# Patient Record
Sex: MALE | Race: WHITE | NOT HISPANIC OR LATINO | Employment: FULL TIME | URBAN - NONMETROPOLITAN AREA
[De-identification: names, ages, dates, MRNs, and addresses within clinical notes are randomized per-mention and may not be internally consistent; named-entity substitution may affect disease eponyms.]

---

## 2016-07-01 LAB — CRC RECOMMENDATION EXT: NORMAL

## 2019-08-19 ENCOUNTER — HISTORICAL (OUTPATIENT)
Dept: ADMINISTRATIVE | Facility: HOSPITAL | Age: 49
End: 2019-08-19

## 2020-02-27 LAB
INFLUENZA A ANTIGEN, POC: NEGATIVE
INFLUENZA B ANTIGEN, POC: NEGATIVE

## 2021-03-06 ENCOUNTER — HISTORICAL (OUTPATIENT)
Dept: ADMINISTRATIVE | Facility: HOSPITAL | Age: 51
End: 2021-03-06

## 2021-06-11 ENCOUNTER — HISTORICAL (OUTPATIENT)
Dept: ADMINISTRATIVE | Facility: HOSPITAL | Age: 51
End: 2021-06-11

## 2021-06-11 LAB
ALBUMIN SERPL-MCNC: 4.9 G/DL (ref 3.8–4.9)
ALBUMIN/GLOB SERPL: 2.3 {RATIO} (ref 1.2–2.2)
ALP SERPL-CCNC: 70 IU/L (ref 48–121)
ALT SERPL-CCNC: 42 IU/L (ref 0–44)
AST SERPL-CCNC: 22 IU/L (ref 0–40)
BASOPHILS # BLD AUTO: 0.1 X10E3/UL (ref 0–0.2)
BASOPHILS NFR BLD AUTO: 1 %
BILIRUB SERPL-MCNC: 0.7 MG/DL (ref 0–1.2)
BUN SERPL-MCNC: 18 MG/DL (ref 6–24)
CALCIUM SERPL-MCNC: 10.8 MG/DL (ref 8.7–10.2)
CHLORIDE SERPL-SCNC: 101 MMOL/L (ref 96–106)
CHOLEST SERPL-MCNC: 169 MG/DL (ref 100–199)
CHOLEST/HDLC SERPL: 4.4 RATIO (ref 0–5)
CO2 SERPL-SCNC: 23 MMOL/L (ref 20–29)
CREAT SERPL-MCNC: 1.01 MG/DL (ref 0.76–1.27)
CREAT/UREA NIT SERPL: 18 (ref 9–20)
EOSINOPHIL # BLD AUTO: 0.4 X10E3/UL (ref 0–0.4)
EOSINOPHIL NFR BLD AUTO: 5 %
ERYTHROCYTE [DISTWIDTH] IN BLOOD BY AUTOMATED COUNT: 12.7 % (ref 11.6–15.4)
GLOBULIN SER-MCNC: 2.1 G/DL (ref 1.5–4.5)
GLUCOSE SERPL-MCNC: 176 MG/DL (ref 65–99)
HBA1C MFR BLD: 8.1 % (ref 4.8–5.6)
HCT VFR BLD AUTO: 52.4 % (ref 37.5–51)
HDLC SERPL-MCNC: 38 MG/DL
HGB BLD-MCNC: 17.2 G/DL (ref 13–17.7)
LDLC SERPL CALC-MCNC: 104 MG/DL (ref 0–99)
LYMPHOCYTES # BLD AUTO: 2.1 X10E3/UL (ref 0.7–3.1)
LYMPHOCYTES NFR BLD AUTO: 26 %
MCH RBC QN AUTO: 30.1 PG (ref 26.6–33)
MCHC RBC AUTO-ENTMCNC: 32.8 G/DL (ref 31.5–35.7)
MCV RBC AUTO: 92 FL (ref 79–97)
MONOCYTES # BLD AUTO: 0.7 X10E3/UL (ref 0.1–0.9)
MONOCYTES NFR BLD AUTO: 8 %
NEUTROPHILS # BLD AUTO: 5 X10E3/UL (ref 1.4–7)
NEUTROPHILS NFR BLD AUTO: 60 %
PLATELET # BLD AUTO: 229 X10E3/UL (ref 150–450)
POTASSIUM SERPL-SCNC: 4.9 MMOL/L (ref 3.5–5.2)
PROT SERPL-MCNC: 7 G/DL (ref 6–8.5)
RBC # BLD AUTO: 5.72 X10(6)/MCL (ref 4.14–5.8)
SODIUM SERPL-SCNC: 141 MMOL/L (ref 134–144)
TRIGL SERPL-MCNC: 153 MG/DL (ref 0–149)
TSH SERPL-ACNC: 1.19 MIU/ML (ref 0.45–4.5)
VLDLC SERPL CALC-MCNC: 27 MG/DL (ref 5–40)
WBC # SPEC AUTO: 8.2 X10E3/UL (ref 3.4–10.8)

## 2021-08-20 LAB
LEFT EYE DM RETINOPATHY: POSITIVE
RIGHT EYE DM RETINOPATHY: POSITIVE

## 2021-08-27 LAB
LEFT EYE DM RETINOPATHY: POSITIVE
RIGHT EYE DM RETINOPATHY: POSITIVE

## 2021-09-10 ENCOUNTER — HISTORICAL (OUTPATIENT)
Dept: ADMINISTRATIVE | Facility: HOSPITAL | Age: 51
End: 2021-09-10

## 2021-09-10 LAB
ALBUMIN SERPL-MCNC: 4.9 G/DL (ref 3.8–4.9)
ALBUMIN/GLOB SERPL: 2.1 {RATIO} (ref 1.2–2.2)
ALP SERPL-CCNC: 78 IU/L (ref 48–121)
ALT SERPL-CCNC: 33 IU/L (ref 0–44)
AST SERPL-CCNC: 18 IU/L (ref 0–40)
BASOPHILS # BLD AUTO: 0.1 X10E3/UL (ref 0–0.2)
BASOPHILS NFR BLD AUTO: 1 %
BILIRUB SERPL-MCNC: 0.6 MG/DL (ref 0–1.2)
BUN SERPL-MCNC: 24 MG/DL (ref 6–24)
CALCIUM SERPL-MCNC: 10.1 MG/DL (ref 8.7–10.2)
CHLORIDE SERPL-SCNC: 103 MMOL/L (ref 96–106)
CHOLEST SERPL-MCNC: 173 MG/DL (ref 100–199)
CHOLEST/HDLC SERPL: 4.8 RATIO (ref 0–5)
CO2 SERPL-SCNC: 20 MMOL/L (ref 20–29)
CREAT SERPL-MCNC: 0.97 MG/DL (ref 0.76–1.27)
CREAT/UREA NIT SERPL: 25 (ref 9–20)
EOSINOPHIL # BLD AUTO: 0.3 X10E3/UL (ref 0–0.4)
EOSINOPHIL NFR BLD AUTO: 5 %
ERYTHROCYTE [DISTWIDTH] IN BLOOD BY AUTOMATED COUNT: 12.7 % (ref 11.6–15.4)
GLOBULIN SER-MCNC: 2.3 G/DL (ref 1.5–4.5)
GLUCOSE SERPL-MCNC: 181 MG/DL (ref 65–99)
HBA1C MFR BLD: 7.4 % (ref 4.8–5.6)
HCT VFR BLD AUTO: 49.3 % (ref 37.5–51)
HDLC SERPL-MCNC: 36 MG/DL
HGB BLD-MCNC: 16.2 G/DL (ref 13–17.7)
LDLC SERPL CALC-MCNC: 106 MG/DL (ref 0–99)
LYMPHOCYTES # BLD AUTO: 2.3 X10E3/UL (ref 0.7–3.1)
LYMPHOCYTES NFR BLD AUTO: 32 %
MCH RBC QN AUTO: 30.1 PG (ref 26.6–33)
MCHC RBC AUTO-ENTMCNC: 32.9 G/DL (ref 31.5–35.7)
MCV RBC AUTO: 92 FL (ref 79–97)
MONOCYTES # BLD AUTO: 0.6 X10E3/UL (ref 0.1–0.9)
MONOCYTES NFR BLD AUTO: 8 %
NEUTROPHILS # BLD AUTO: 3.9 X10E3/UL (ref 1.4–7)
NEUTROPHILS NFR BLD AUTO: 54 %
PLATELET # BLD AUTO: 232 X10E3/UL (ref 150–450)
POTASSIUM SERPL-SCNC: 4.9 MMOL/L (ref 3.5–5.2)
PROT SERPL-MCNC: 7.2 G/DL (ref 6–8.5)
RBC # BLD AUTO: 5.39 X10(6)/MCL (ref 4.14–5.8)
SODIUM SERPL-SCNC: 136 MMOL/L (ref 134–144)
TRIGL SERPL-MCNC: 178 MG/DL (ref 0–149)
VLDLC SERPL CALC-MCNC: 31 MG/DL (ref 5–40)
WBC # SPEC AUTO: 7.2 X10E3/UL (ref 3.4–10.8)

## 2022-04-10 ENCOUNTER — HISTORICAL (OUTPATIENT)
Dept: ADMINISTRATIVE | Facility: HOSPITAL | Age: 52
End: 2022-04-10

## 2022-04-28 VITALS
DIASTOLIC BLOOD PRESSURE: 78 MMHG | HEIGHT: 71 IN | BODY MASS INDEX: 30.86 KG/M2 | WEIGHT: 220.44 LBS | SYSTOLIC BLOOD PRESSURE: 134 MMHG | OXYGEN SATURATION: 97 %

## 2022-09-17 ENCOUNTER — HISTORICAL (OUTPATIENT)
Dept: ADMINISTRATIVE | Facility: HOSPITAL | Age: 52
End: 2022-09-17

## 2023-01-17 ENCOUNTER — DOCUMENTATION ONLY (OUTPATIENT)
Dept: ADMINISTRATIVE | Facility: HOSPITAL | Age: 53
End: 2023-01-17

## 2023-02-20 ENCOUNTER — TELEPHONE (OUTPATIENT)
Dept: FAMILY MEDICINE | Facility: CLINIC | Age: 53
End: 2023-02-20
Payer: COMMERCIAL

## 2023-05-04 PROCEDURE — 80061 LIPID PANEL: CPT | Performed by: FAMILY MEDICINE

## 2023-05-04 PROCEDURE — 84443 ASSAY THYROID STIM HORMONE: CPT | Performed by: FAMILY MEDICINE

## 2023-05-04 PROCEDURE — 83036 HEMOGLOBIN GLYCOSYLATED A1C: CPT | Performed by: FAMILY MEDICINE

## 2023-05-04 PROCEDURE — 84153 ASSAY OF PSA TOTAL: CPT | Performed by: FAMILY MEDICINE

## 2023-05-04 PROCEDURE — 85025 COMPLETE CBC W/AUTO DIFF WBC: CPT | Performed by: FAMILY MEDICINE

## 2023-05-04 PROCEDURE — 80053 COMPREHEN METABOLIC PANEL: CPT | Performed by: FAMILY MEDICINE

## 2023-05-10 ENCOUNTER — OFFICE VISIT (OUTPATIENT)
Dept: FAMILY MEDICINE | Facility: CLINIC | Age: 53
End: 2023-05-10
Payer: COMMERCIAL

## 2023-05-10 VITALS
TEMPERATURE: 97 F | OXYGEN SATURATION: 97 % | WEIGHT: 226.38 LBS | BODY MASS INDEX: 31.69 KG/M2 | HEART RATE: 80 BPM | HEIGHT: 71 IN | DIASTOLIC BLOOD PRESSURE: 80 MMHG | SYSTOLIC BLOOD PRESSURE: 138 MMHG

## 2023-05-10 DIAGNOSIS — E11.9 TYPE 2 DIABETES MELLITUS WITHOUT COMPLICATION, WITHOUT LONG-TERM CURRENT USE OF INSULIN: ICD-10-CM

## 2023-05-10 DIAGNOSIS — E78.5 HYPERLIPIDEMIA, UNSPECIFIED HYPERLIPIDEMIA TYPE: ICD-10-CM

## 2023-05-10 DIAGNOSIS — M51.34 DEGENERATION OF INTERVERTEBRAL DISC OF THORACIC REGION: Primary | ICD-10-CM

## 2023-05-10 DIAGNOSIS — Z00.00 WELLNESS EXAMINATION: ICD-10-CM

## 2023-05-10 DIAGNOSIS — I10 PRIMARY HYPERTENSION: ICD-10-CM

## 2023-05-10 DIAGNOSIS — Z86.010 HISTORY OF COLONIC POLYPS: ICD-10-CM

## 2023-05-10 DIAGNOSIS — N40.0 BENIGN PROSTATIC HYPERPLASIA WITHOUT URINARY OBSTRUCTION: ICD-10-CM

## 2023-05-10 DIAGNOSIS — M79.675 PAIN OF TOE OF LEFT FOOT: ICD-10-CM

## 2023-05-10 PROBLEM — Z86.0100 HISTORY OF COLONIC POLYPS: Status: ACTIVE | Noted: 2023-05-10

## 2023-05-10 PROCEDURE — 3008F BODY MASS INDEX DOCD: CPT | Mod: CPTII,,, | Performed by: FAMILY MEDICINE

## 2023-05-10 PROCEDURE — 99396 PREV VISIT EST AGE 40-64: CPT | Mod: ,,, | Performed by: FAMILY MEDICINE

## 2023-05-10 PROCEDURE — 3008F PR BODY MASS INDEX (BMI) DOCUMENTED: ICD-10-PCS | Mod: CPTII,,, | Performed by: FAMILY MEDICINE

## 2023-05-10 PROCEDURE — 3075F PR MOST RECENT SYSTOLIC BLOOD PRESS GE 130-139MM HG: ICD-10-PCS | Mod: CPTII,,, | Performed by: FAMILY MEDICINE

## 2023-05-10 PROCEDURE — 99214 PR OFFICE/OUTPT VISIT, EST, LEVL IV, 30-39 MIN: ICD-10-PCS | Mod: 25,,, | Performed by: FAMILY MEDICINE

## 2023-05-10 PROCEDURE — 3075F SYST BP GE 130 - 139MM HG: CPT | Mod: CPTII,,, | Performed by: FAMILY MEDICINE

## 2023-05-10 PROCEDURE — 3079F PR MOST RECENT DIASTOLIC BLOOD PRESSURE 80-89 MM HG: ICD-10-PCS | Mod: CPTII,,, | Performed by: FAMILY MEDICINE

## 2023-05-10 PROCEDURE — 3079F DIAST BP 80-89 MM HG: CPT | Mod: CPTII,,, | Performed by: FAMILY MEDICINE

## 2023-05-10 PROCEDURE — 1159F PR MEDICATION LIST DOCUMENTED IN MEDICAL RECORD: ICD-10-PCS | Mod: CPTII,,, | Performed by: FAMILY MEDICINE

## 2023-05-10 PROCEDURE — 99396 PR PREVENTIVE VISIT,EST,40-64: ICD-10-PCS | Mod: ,,, | Performed by: FAMILY MEDICINE

## 2023-05-10 PROCEDURE — 4010F PR ACE/ARB THEARPY RXD/TAKEN: ICD-10-PCS | Mod: CPTII,,, | Performed by: FAMILY MEDICINE

## 2023-05-10 PROCEDURE — 4010F ACE/ARB THERAPY RXD/TAKEN: CPT | Mod: CPTII,,, | Performed by: FAMILY MEDICINE

## 2023-05-10 PROCEDURE — 1159F MED LIST DOCD IN RCRD: CPT | Mod: CPTII,,, | Performed by: FAMILY MEDICINE

## 2023-05-10 PROCEDURE — 99214 OFFICE O/P EST MOD 30 MIN: CPT | Mod: 25,,, | Performed by: FAMILY MEDICINE

## 2023-05-10 RX ORDER — CYCLOBENZAPRINE HCL 5 MG
5 TABLET ORAL NIGHTLY
Qty: 30 TABLET | Refills: 2 | Status: SHIPPED | OUTPATIENT
Start: 2023-05-10 | End: 2023-08-08

## 2023-05-10 RX ORDER — EMPAGLIFLOZIN 25 MG/1
25 TABLET, FILM COATED ORAL DAILY
COMMUNITY
Start: 2023-03-06 | End: 2023-12-07 | Stop reason: SDUPTHER

## 2023-05-10 RX ORDER — PEN NEEDLE, DIABETIC 31 GX3/16"
NEEDLE, DISPOSABLE MISCELLANEOUS
COMMUNITY
Start: 2023-03-06 | End: 2024-01-02

## 2023-05-10 RX ORDER — ATORVASTATIN CALCIUM 10 MG/1
10 TABLET, FILM COATED ORAL DAILY
COMMUNITY
Start: 2022-12-12 | End: 2023-05-10

## 2023-05-10 RX ORDER — METFORMIN HYDROCHLORIDE 1000 MG/1
1000 TABLET ORAL 2 TIMES DAILY
COMMUNITY
Start: 2022-12-12 | End: 2023-10-03

## 2023-05-10 RX ORDER — FLUOXETINE HYDROCHLORIDE 20 MG/1
20 CAPSULE ORAL DAILY
COMMUNITY
Start: 2023-03-01 | End: 2023-12-07 | Stop reason: SDUPTHER

## 2023-05-10 RX ORDER — ROSUVASTATIN CALCIUM 10 MG/1
10 TABLET, COATED ORAL DAILY
Qty: 90 TABLET | Refills: 3 | Status: SHIPPED | OUTPATIENT
Start: 2023-05-10 | End: 2024-02-09

## 2023-05-10 RX ORDER — SILDENAFIL 100 MG/1
100 TABLET, FILM COATED ORAL
COMMUNITY
Start: 2022-12-12 | End: 2023-07-25

## 2023-05-10 RX ORDER — (INSULIN DEGLUDEC AND LIRAGLUTIDE) 100; 3.6 [IU]/ML; MG/ML
40 INJECTION, SOLUTION SUBCUTANEOUS DAILY
COMMUNITY
Start: 2022-04-28 | End: 2023-12-07 | Stop reason: SDUPTHER

## 2023-05-10 RX ORDER — TAMSULOSIN HYDROCHLORIDE 0.4 MG/1
1 CAPSULE ORAL DAILY
COMMUNITY
Start: 2023-04-07 | End: 2023-12-07 | Stop reason: SDUPTHER

## 2023-05-10 NOTE — PROGRESS NOTES
SUBJECTIVE:  Juanita Rosa is a 52 y.o. male here for Follow-up (Lab results)      Follow-up  Associated symptoms include arthralgias. Pertinent negatives include no abdominal pain, chest pain, congestion, coughing, fatigue, fever, headaches, joint swelling, myalgias, rash, sore throat or weakness.   Patient here for annual wellness and follow-up on chronic medical conditions.  See assessment and plan for individual list of issues.  Bruces allergies, medications, history, and problem list were updated as appropriate.    Review of Systems   Constitutional:  Negative for activity change, appetite change, fatigue and fever.   HENT:  Negative for congestion, ear pain, hearing loss, sore throat and trouble swallowing.    Eyes:  Negative for photophobia, pain, redness and visual disturbance.   Respiratory:  Negative for cough, chest tightness, shortness of breath and wheezing.    Cardiovascular:  Negative for chest pain, palpitations and leg swelling.   Gastrointestinal:  Negative for abdominal distention, abdominal pain and blood in stool.   Endocrine: Negative for cold intolerance, heat intolerance, polydipsia and polyuria.   Genitourinary:  Negative for difficulty urinating, dysuria and frequency.   Musculoskeletal:  Positive for arthralgias and back pain. Negative for gait problem, joint swelling and myalgias.   Skin:  Negative for color change, pallor and rash.   Allergic/Immunologic: Negative.    Neurological:  Negative for dizziness, seizures, speech difficulty, weakness and headaches.   Hematological:  Negative for adenopathy. Does not bruise/bleed easily.   Psychiatric/Behavioral:  Negative for agitation and confusion.     A comprehensive review of symptoms was completed and negative except as noted above.    Recent Results (from the past 504 hour(s))   Comprehensive Metabolic Panel    Collection Time: 05/04/23  9:37 AM   Result Value Ref Range    Sodium Level 140 135 - 145 mmol/L    Potassium Level 4.8 3.5 - 5.1  mmol/L    Chloride 106 98 - 110 mmol/L    Carbon Dioxide 24 21 - 32 mmol/L    Glucose Level 191 (H) 70 - 115 mg/dL    Blood Urea Nitrogen 23.0 (H) 7.0 - 20.0 mg/dL    Creatinine 1.00 0.66 - 1.25 mg/dL    Calcium Level Total 10.3 (H) 8.4 - 10.2 mg/dL    Protein Total 7.4 6.3 - 8.2 gm/dL    Albumin Level 4.7 3.4 - 5.0 g/dL    Globulin 2.7 2.0 - 3.9 gm/dL    Albumin/Globulin Ratio 1.7 ratio    Bilirubin Total 0.8 0.0 - 1.0 mg/dL    Alkaline Phosphatase 70 50 - 144 unit/L    Alanine Aminotransferase 43 1 - 45 unit/L    Aspartate Aminotransferase 32 17 - 59 unit/L    eGFR 90 mls/min/1.73/m2    Anion Gap 10.0 2.0 - 13.0 mEq/L    BUN/Creatinine Ratio 23 (H) 12 - 20   Hemoglobin A1C    Collection Time: 05/04/23  9:37 AM   Result Value Ref Range    Hemoglobin A1c 7.5 (H) 4.0 - 6.0 %    Estimated Average Glucose 168.6 (H) 70.0 - 115.0 mg/dL   Lipid Panel    Collection Time: 05/04/23  9:37 AM   Result Value Ref Range    Cholesterol Total 206 (H) 0 - 200 mg/dL    HDL Cholesterol 32 (L) 40 - 60 mg/dL    Triglyceride 206 (H) 30 - 200 mg/dL    LDL Cholesterol Direct 125.4 (H) 30.0 - 100.0 mg/dL   TSH    Collection Time: 05/04/23  9:37 AM   Result Value Ref Range    Thyroid Stimulating Hormone 0.819 0.360 - 3.740 uIU/mL   PSA, Total (Diagnostic)    Collection Time: 05/04/23  9:37 AM   Result Value Ref Range    Prostate Specific Antigen 0.75 <=4.00 ng/mL   CBC with Differential    Collection Time: 05/04/23  9:37 AM   Result Value Ref Range    WBC 8.19 4.00 - 11.50 x10(3)/mcL    RBC 5.86 4.00 - 6.00 x10(6)/mcL    Hgb 17.3 13.0 - 18.0 g/dL    Hct 50.5 36.0 - 52.0 %    MCV 86.2 79.0 - 99.0 fL    MCH 29.5 27.0 - 34.0 pg    MCHC 34.3 31.0 - 37.0 g/dL    RDW 12.2 %    Platelet 282 140 - 371 x10(3)/mcL    MPV 10.4 9.4 - 12.4 fL    Neut % 58.0 37 - 73 %    Lymph % 26.3 20 - 55 %    Mono % 8.1 4.7 - 12.5 %    Eos % 6.1 0.7 - 7 %    Basophil % 1.0 0.1 - 1.2 %    Lymph # 2.15 1.32 - 3.57 x10(3)/mcL    Neut # 4.76 1.78 - 5.38 x10(3)/mcL    Mono  "# 0.66 0.3 - 0.82 x10(3)/mcL    Eos # 0.50 0.04 - 0.54 x10(3)/mcL    Baso # 0.08 0.01 - 0.08 x10(3)/mcL    IG# 0.04 (H) 0.0001 - 0.031 x10(3)/mcL    IG% 0.5 0 - 0.5 %    NRBC% 0.0 0 - 1 %       OBJECTIVE:  Vital signs  Vitals:    05/10/23 0953   BP: 138/80   BP Location: Right arm   Patient Position: Sitting   BP Method: Medium (Manual)   Pulse: 80   Temp: 97.4 °F (36.3 °C)   TempSrc: Oral   SpO2: 97%   Weight: 102.7 kg (226 lb 6.4 oz)   Height: 5' 10.67" (1.795 m)        Physical Exam  Vitals and nursing note reviewed.   Constitutional:       General: He is not in acute distress.     Appearance: Normal appearance. He is not ill-appearing.   HENT:      Head: Normocephalic and atraumatic.      Right Ear: External ear normal.      Left Ear: External ear normal.      Nose: Nose normal.      Mouth/Throat:      Mouth: Mucous membranes are moist.      Pharynx: Oropharynx is clear.   Eyes:      Extraocular Movements: Extraocular movements intact.      Conjunctiva/sclera: Conjunctivae normal.   Cardiovascular:      Rate and Rhythm: Normal rate and regular rhythm.      Heart sounds: Normal heart sounds. No murmur heard.  Pulmonary:      Effort: Pulmonary effort is normal.      Breath sounds: Normal breath sounds. No wheezing or rhonchi.   Abdominal:      General: Abdomen is flat. There is no distension.      Palpations: Abdomen is soft.      Tenderness: There is no abdominal tenderness.   Musculoskeletal:         General: No swelling or deformity. Normal range of motion.      Cervical back: Normal range of motion and neck supple.   Skin:     General: Skin is warm and dry.      Findings: No bruising or rash.      Comments: Toenail dystrophy of the left great toenail   Neurological:      General: No focal deficit present.      Mental Status: He is alert and oriented to person, place, and time.      Cranial Nerves: No cranial nerve deficit.      Motor: No weakness.   Psychiatric:         Mood and Affect: Mood normal.         " Behavior: Behavior normal.         Thought Content: Thought content normal.        ASSESSMENT/PLAN:  1. 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 Hyperlipidemia, unspecified hyperlipidemia type  LDL is not at goal.  We will switch from atorvastatin 10 mg daily to rosuvastatin 10 mg daily    3. Primary hypertension  Currently on lisinopril    4. Benign prostatic hyperplasia without urinary obstruction  Flomax    5. Type 2 diabetes mellitus without complication, without long-term current use of insulin  On Xultophy 40 units daily, Jardiance, and metformin.  If he can increase his exercise I would like him to stay at 40 units but if he will not increase his walking and exercise go ahead and increase to 50 units daily as his fasting sugars are around 200.  Current A1c is 7.5  Also try to get him set up with continuous glucose monitoring  6. History of colonic polyps  It is time for a colonoscopy we will get this set up    7. Wellness examination  PSA is normal.  Set up colonoscopy    8. Toe pain with nail dystrophy - podiatry referral  Other orders  -     cyclobenzaprine (FLEXERIL) 5 MG tablet; Take 1 tablet (5 mg total) by mouth nightly.  Dispense: 30 tablet; Refill: 2  -     rosuvastatin (CRESTOR) 10 MG tablet; Take 1 tablet (10 mg total) by mouth once daily.  Dispense: 90 tablet; Refill: 3         Follow Up:  Follow up in about 6 months (around 11/10/2023) for recheck, fasting labs.

## 2023-06-19 RX ORDER — PHENTERMINE HYDROCHLORIDE 37.5 MG/1
TABLET ORAL
Qty: 30 TABLET | Refills: 3 | Status: SHIPPED | OUTPATIENT
Start: 2023-06-19 | End: 2023-10-23

## 2023-06-23 ENCOUNTER — OFFICE VISIT (OUTPATIENT)
Dept: FAMILY MEDICINE | Facility: CLINIC | Age: 53
End: 2023-06-23
Payer: COMMERCIAL

## 2023-06-23 VITALS
SYSTOLIC BLOOD PRESSURE: 122 MMHG | BODY MASS INDEX: 31.36 KG/M2 | HEIGHT: 71 IN | TEMPERATURE: 98 F | WEIGHT: 224 LBS | DIASTOLIC BLOOD PRESSURE: 80 MMHG | HEART RATE: 83 BPM | OXYGEN SATURATION: 99 %

## 2023-06-23 DIAGNOSIS — L08.89 SECONDARY INFECTION OF SKIN: Primary | ICD-10-CM

## 2023-06-23 PROCEDURE — 3008F PR BODY MASS INDEX (BMI) DOCUMENTED: ICD-10-PCS | Mod: CPTII,,, | Performed by: NURSE PRACTITIONER

## 2023-06-23 PROCEDURE — 3079F DIAST BP 80-89 MM HG: CPT | Mod: CPTII,,, | Performed by: NURSE PRACTITIONER

## 2023-06-23 PROCEDURE — 3074F SYST BP LT 130 MM HG: CPT | Mod: CPTII,,, | Performed by: NURSE PRACTITIONER

## 2023-06-23 PROCEDURE — 4010F PR ACE/ARB THEARPY RXD/TAKEN: ICD-10-PCS | Mod: CPTII,,, | Performed by: NURSE PRACTITIONER

## 2023-06-23 PROCEDURE — 3074F PR MOST RECENT SYSTOLIC BLOOD PRESSURE < 130 MM HG: ICD-10-PCS | Mod: CPTII,,, | Performed by: NURSE PRACTITIONER

## 2023-06-23 PROCEDURE — 3079F PR MOST RECENT DIASTOLIC BLOOD PRESSURE 80-89 MM HG: ICD-10-PCS | Mod: CPTII,,, | Performed by: NURSE PRACTITIONER

## 2023-06-23 PROCEDURE — 1160F RVW MEDS BY RX/DR IN RCRD: CPT | Mod: CPTII,,, | Performed by: NURSE PRACTITIONER

## 2023-06-23 PROCEDURE — 99213 PR OFFICE/OUTPT VISIT, EST, LEVL III, 20-29 MIN: ICD-10-PCS | Mod: ,,, | Performed by: NURSE PRACTITIONER

## 2023-06-23 PROCEDURE — 99213 OFFICE O/P EST LOW 20 MIN: CPT | Mod: ,,, | Performed by: NURSE PRACTITIONER

## 2023-06-23 PROCEDURE — 1159F MED LIST DOCD IN RCRD: CPT | Mod: CPTII,,, | Performed by: NURSE PRACTITIONER

## 2023-06-23 PROCEDURE — 4010F ACE/ARB THERAPY RXD/TAKEN: CPT | Mod: CPTII,,, | Performed by: NURSE PRACTITIONER

## 2023-06-23 PROCEDURE — 3008F BODY MASS INDEX DOCD: CPT | Mod: CPTII,,, | Performed by: NURSE PRACTITIONER

## 2023-06-23 PROCEDURE — 1159F PR MEDICATION LIST DOCUMENTED IN MEDICAL RECORD: ICD-10-PCS | Mod: CPTII,,, | Performed by: NURSE PRACTITIONER

## 2023-06-23 PROCEDURE — 1160F PR REVIEW ALL MEDS BY PRESCRIBER/CLIN PHARMACIST DOCUMENTED: ICD-10-PCS | Mod: CPTII,,, | Performed by: NURSE PRACTITIONER

## 2023-06-23 RX ORDER — CLINDAMYCIN HYDROCHLORIDE 300 MG/1
300 CAPSULE ORAL EVERY 6 HOURS
Qty: 40 CAPSULE | Refills: 0 | Status: SHIPPED | OUTPATIENT
Start: 2023-06-23 | End: 2023-07-03

## 2023-06-23 NOTE — PROGRESS NOTES
"Patient ID: Juanita Rosa  : 1970    Chief Complaint: Rash (Since May 28th)    Allergies: Patient is allergic to sulfa (sulfonamide antibiotics).     History of Present Illness:  The patient is a 52 y.o. White male who presents to clinic for follow up on Rash (Since May 28th)     Here today with complains of rash that began nearly a month ago. Dx with shingles on the 28th May and given Acyclovir which he completed. He has painful burning lesions on his left buttocks, were blistered initially but now dry and scabbed. Does have some surrounding erythema. Does not feel that they are worsening but lesions do not seem to be healing as he feels they should. The pain has improved since the beginning. He has been washing with dial soap and applying Bactoban ointment 1-2 times daily. Denies fever.     Social History:  reports that he quit smoking about 24 years ago. His smoking use included cigarettes. He has never used smokeless tobacco. He reports current alcohol use. He reports that he does not use drugs.    Past Medical History:  has no past medical history on file.    Current Medications:  Current Outpatient Medications   Medication Instructions    clindamycin (CLEOCIN) 300 mg, Oral, Every 6 hours    cyclobenzaprine (FLEXERIL) 5 mg, Oral, Nightly    FLUoxetine 20 mg, Oral, Daily    insulin degludec-liraglutide (XULTOPHY 100/3.6) 100 unit-3.6 mg /mL (3 mL) Pen 40 Units, Subcutaneous, Daily    JARDIANCE 25 mg, Oral, Daily    lisinopriL 10 MG tablet TAKE 1 TABLET DAILY    metFORMIN (GLUCOPHAGE) 1,000 mg, Oral, 2 times daily    phentermine (ADIPEX-P) 37.5 mg tablet TAKE 1 TABLET BY MOUTH DAILY    rosuvastatin (CRESTOR) 10 mg, Oral, Daily    sildenafiL (VIAGRA) 100 mg, Oral, As needed (PRN)    SURE COMFORT PEN NEEDLE 31 gauge x 3/16" Ndle USE ONCE DAILY WITH INSULIN    tamsulosin (FLOMAX) 0.4 mg Cap 1 capsule, Oral, Daily       Review of Systems   See HPI    Visit Vitals  /80   Pulse 83   Temp 98.1 °F (36.7 °C) " "(Temporal)   Ht 5' 10.67" (1.795 m)   Wt 101.6 kg (224 lb)   SpO2 99%   BMI 31.53 kg/m²       Physical Exam  Vitals reviewed.   Cardiovascular:      Heart sounds: Normal heart sounds.   Pulmonary:      Breath sounds: Normal breath sounds.   Musculoskeletal:      Right lower leg: No edema.      Left lower leg: No edema.   Skin:     Findings: Lesion (multiple scabbed lesions left buttocks with mild surrounding erythema; no drainage noted.) present.                  Assessment & Plan:  1. Secondary infection of skin  Continue to clean lesions with antibacterial soap, pat dry and apply antibiotic ointment when feasible.  Cotton undergarments may be preferred over polyester fabrics that trap moisture.  Start clindamycin 300 mg as directed.  Call the office next week if concerns for worsening are noted.    -     clindamycin (CLEOCIN) 300 MG capsule; Take 1 capsule (300 mg total) by mouth every 6 (six) hours. for 10 days  Dispense: 40 capsule; Refill: 0         Future Appointments   Date Time Provider Department Center   11/16/2023  8:00 AM LAB, [facility] LABORATORY DRAW STATION [facility] MANUELA Cast [facility]   12/7/2023  8[facility]30 [facility]       Follow up if symptoms worsen or fail to improve. Call sooner if needed.    CEZAR Batista-ONOFRE    Lab Frequency Next Occurrence   CBC Auto Differential Once 11/10/2023   Comprehensive Metabolic Panel Once 11/10/2023   Lipid Panel Once 11/10/2023   Hemoglobin A1C Once 11/10/2023   Vitamin B12 Once 11/10/2023   Methylmalonic Acid, Serum Once 11/10/2023   Ambulatory referral/consult to Gastroenterology Once 05/17/2023   Ambulatory referral/consult to Podiatry Once 05/17/2023          "

## 2023-06-28 DIAGNOSIS — I10 HYPERTENSION, UNSPECIFIED TYPE: ICD-10-CM

## 2023-06-28 RX ORDER — LISINOPRIL 10 MG/1
10 TABLET ORAL DAILY
Qty: 90 TABLET | Refills: 1 | Status: SHIPPED | OUTPATIENT
Start: 2023-06-28 | End: 2024-02-09

## 2023-07-24 DIAGNOSIS — N52.9 ERECTILE DYSFUNCTION, UNSPECIFIED ERECTILE DYSFUNCTION TYPE: Primary | ICD-10-CM

## 2023-07-25 RX ORDER — SILDENAFIL 100 MG/1
TABLET, FILM COATED ORAL
Qty: 15 TABLET | Refills: 5 | Status: SHIPPED | OUTPATIENT
Start: 2023-07-25 | End: 2024-03-11 | Stop reason: CLARIF

## 2023-10-03 DIAGNOSIS — E11.9 TYPE 2 DIABETES MELLITUS WITHOUT COMPLICATION, WITHOUT LONG-TERM CURRENT USE OF INSULIN: Primary | ICD-10-CM

## 2023-10-03 RX ORDER — METFORMIN HYDROCHLORIDE 1000 MG/1
1000 TABLET ORAL 2 TIMES DAILY
Qty: 180 TABLET | Refills: 3 | Status: SHIPPED | OUTPATIENT
Start: 2023-10-03

## 2023-10-23 RX ORDER — PHENTERMINE HYDROCHLORIDE 37.5 MG/1
37.5 TABLET ORAL
Qty: 30 TABLET | Refills: 3 | Status: SHIPPED | OUTPATIENT
Start: 2023-10-23 | End: 2023-12-18 | Stop reason: SDUPTHER

## 2023-11-17 LAB
CREATININE, URINE: 92.4 MG/DL
MICROALB/CREAT RATIO: <13
MICROALBUMIN URINE: <12

## 2023-11-23 LAB
ALBUMIN SERPL-MCNC: 4.9 G/DL (ref 3.8–4.9)
ALBUMIN/GLOB SERPL: 2.2 {RATIO} (ref 1.2–2.2)
ALP SERPL-CCNC: 77 IU/L (ref 44–121)
ALT SERPL-CCNC: 25 IU/L (ref 0–44)
AST SERPL-CCNC: 20 IU/L (ref 0–40)
BASOPHILS # BLD AUTO: 0.1 X10E3/UL (ref 0–0.2)
BASOPHILS NFR BLD AUTO: 1 %
BILIRUB SERPL-MCNC: 0.5 MG/DL (ref 0–1.2)
BUN SERPL-MCNC: 21 MG/DL (ref 6–24)
BUN/CREAT SERPL: 19 (ref 9–20)
CALCIUM SERPL-MCNC: 10.2 MG/DL (ref 8.7–10.2)
CHLORIDE SERPL-SCNC: 102 MMOL/L (ref 96–106)
CHOLEST SERPL-MCNC: 140 MG/DL (ref 100–199)
CO2 SERPL-SCNC: 18 MMOL/L (ref 20–29)
CREAT SERPL-MCNC: 1.1 MG/DL (ref 0.76–1.27)
EOSINOPHIL # BLD AUTO: 0.3 X10E3/UL (ref 0–0.4)
EOSINOPHIL NFR BLD AUTO: 4 %
ERYTHROCYTE [DISTWIDTH] IN BLOOD BY AUTOMATED COUNT: 12.8 % (ref 11.6–15.4)
EST. GFR  (NO RACE VARIABLE): 80 ML/MIN/1.73
GLOBULIN SER CALC-MCNC: 2.2 G/DL (ref 1.5–4.5)
GLUCOSE SERPL-MCNC: 156 MG/DL (ref 70–99)
HBA1C MFR BLD: 7.1 % (ref 4.8–5.6)
HCT VFR BLD AUTO: 50.6 % (ref 37.5–51)
HDLC SERPL-MCNC: 35 MG/DL
HGB BLD-MCNC: 16.6 G/DL (ref 13–17.7)
IMM GRANULOCYTES NFR BLD AUTO: 0 %
LDLC SERPL CALC-MCNC: 80 MG/DL (ref 0–99)
LYMPHOCYTES # BLD AUTO: 2.1 X10E3/UL (ref 0.7–3.1)
LYMPHOCYTES NFR BLD AUTO: 25 %
MCH RBC QN AUTO: 29.9 PG (ref 26.6–33)
MCHC RBC AUTO-ENTMCNC: 32.8 G/DL (ref 31.5–35.7)
MCV RBC AUTO: 91 FL (ref 79–97)
METHYLMALONATE SERPL-SCNC: 152 NMOL/L (ref 0–378)
MONOCYTES # BLD AUTO: 0.7 X10E3/UL (ref 0.1–0.9)
MONOCYTES NFR BLD AUTO: 8 %
NEUTROPHILS # BLD AUTO: 5.1 X10E3/UL (ref 1.4–7)
NEUTROPHILS NFR BLD AUTO: 62 %
PLATELET # BLD AUTO: 242 X10E3/UL (ref 150–450)
POTASSIUM SERPL-SCNC: 4.7 MMOL/L (ref 3.5–5.2)
PROT SERPL-MCNC: 7.1 G/DL (ref 6–8.5)
RBC # BLD AUTO: 5.56 X10E6/UL (ref 4.14–5.8)
SODIUM SERPL-SCNC: 139 MMOL/L (ref 134–144)
TRIGL SERPL-MCNC: 138 MG/DL (ref 0–149)
VIT B12 SERPL-MCNC: 442 PG/ML (ref 232–1245)
VLDLC SERPL CALC-MCNC: 25 MG/DL (ref 5–40)
WBC # BLD AUTO: 8.4 X10E3/UL (ref 3.4–10.8)

## 2023-12-07 ENCOUNTER — OFFICE VISIT (OUTPATIENT)
Dept: FAMILY MEDICINE | Facility: CLINIC | Age: 53
End: 2023-12-07
Payer: COMMERCIAL

## 2023-12-07 ENCOUNTER — PATIENT MESSAGE (OUTPATIENT)
Dept: FAMILY MEDICINE | Facility: CLINIC | Age: 53
End: 2023-12-07

## 2023-12-07 VITALS
DIASTOLIC BLOOD PRESSURE: 76 MMHG | BODY MASS INDEX: 31.22 KG/M2 | SYSTOLIC BLOOD PRESSURE: 129 MMHG | OXYGEN SATURATION: 98 % | WEIGHT: 223 LBS | HEART RATE: 82 BPM | HEIGHT: 71 IN

## 2023-12-07 DIAGNOSIS — N52.9 ERECTILE DYSFUNCTION, UNSPECIFIED ERECTILE DYSFUNCTION TYPE: ICD-10-CM

## 2023-12-07 DIAGNOSIS — E78.5 HYPERLIPIDEMIA, UNSPECIFIED HYPERLIPIDEMIA TYPE: ICD-10-CM

## 2023-12-07 DIAGNOSIS — N40.0 BENIGN PROSTATIC HYPERPLASIA WITHOUT URINARY OBSTRUCTION: ICD-10-CM

## 2023-12-07 DIAGNOSIS — E11.9 TYPE 2 DIABETES MELLITUS WITHOUT COMPLICATION, WITHOUT LONG-TERM CURRENT USE OF INSULIN: ICD-10-CM

## 2023-12-07 DIAGNOSIS — M51.34 DEGENERATION OF INTERVERTEBRAL DISC OF THORACIC REGION: Primary | ICD-10-CM

## 2023-12-07 DIAGNOSIS — I10 PRIMARY HYPERTENSION: ICD-10-CM

## 2023-12-07 PROCEDURE — 4010F PR ACE/ARB THEARPY RXD/TAKEN: ICD-10-PCS | Mod: CPTII,,, | Performed by: FAMILY MEDICINE

## 2023-12-07 PROCEDURE — 1159F PR MEDICATION LIST DOCUMENTED IN MEDICAL RECORD: ICD-10-PCS | Mod: CPTII,,, | Performed by: FAMILY MEDICINE

## 2023-12-07 PROCEDURE — 4010F ACE/ARB THERAPY RXD/TAKEN: CPT | Mod: CPTII,,, | Performed by: FAMILY MEDICINE

## 2023-12-07 PROCEDURE — 3051F PR MOST RECENT HEMOGLOBIN A1C LEVEL 7.0 - < 8.0%: ICD-10-PCS | Mod: CPTII,,, | Performed by: FAMILY MEDICINE

## 2023-12-07 PROCEDURE — 3078F DIAST BP <80 MM HG: CPT | Mod: CPTII,,, | Performed by: FAMILY MEDICINE

## 2023-12-07 PROCEDURE — 3008F PR BODY MASS INDEX (BMI) DOCUMENTED: ICD-10-PCS | Mod: CPTII,,, | Performed by: FAMILY MEDICINE

## 2023-12-07 PROCEDURE — 3051F HG A1C>EQUAL 7.0%<8.0%: CPT | Mod: CPTII,,, | Performed by: FAMILY MEDICINE

## 2023-12-07 PROCEDURE — 3008F BODY MASS INDEX DOCD: CPT | Mod: CPTII,,, | Performed by: FAMILY MEDICINE

## 2023-12-07 PROCEDURE — 3078F PR MOST RECENT DIASTOLIC BLOOD PRESSURE < 80 MM HG: ICD-10-PCS | Mod: CPTII,,, | Performed by: FAMILY MEDICINE

## 2023-12-07 PROCEDURE — 99214 OFFICE O/P EST MOD 30 MIN: CPT | Mod: ,,, | Performed by: FAMILY MEDICINE

## 2023-12-07 PROCEDURE — 3074F PR MOST RECENT SYSTOLIC BLOOD PRESSURE < 130 MM HG: ICD-10-PCS | Mod: CPTII,,, | Performed by: FAMILY MEDICINE

## 2023-12-07 PROCEDURE — 99214 PR OFFICE/OUTPT VISIT, EST, LEVL IV, 30-39 MIN: ICD-10-PCS | Mod: ,,, | Performed by: FAMILY MEDICINE

## 2023-12-07 PROCEDURE — 3074F SYST BP LT 130 MM HG: CPT | Mod: CPTII,,, | Performed by: FAMILY MEDICINE

## 2023-12-07 PROCEDURE — 1159F MED LIST DOCD IN RCRD: CPT | Mod: CPTII,,, | Performed by: FAMILY MEDICINE

## 2023-12-07 RX ORDER — FLUOXETINE HYDROCHLORIDE 20 MG/1
20 CAPSULE ORAL DAILY
Qty: 90 CAPSULE | Refills: 1 | Status: SHIPPED | OUTPATIENT
Start: 2023-12-07 | End: 2024-06-04

## 2023-12-07 RX ORDER — PHENTERMINE HYDROCHLORIDE 37.5 MG/1
37.5 TABLET ORAL
Qty: 30 TABLET | Refills: 3 | Status: CANCELLED | OUTPATIENT
Start: 2023-12-07

## 2023-12-07 RX ORDER — EMPAGLIFLOZIN 25 MG/1
25 TABLET, FILM COATED ORAL DAILY
Qty: 90 TABLET | Refills: 1 | Status: SHIPPED | OUTPATIENT
Start: 2023-12-07 | End: 2024-06-04

## 2023-12-07 RX ORDER — TIRZEPATIDE 2.5 MG/.5ML
2.5 INJECTION, SOLUTION SUBCUTANEOUS
Qty: 12 PEN | Refills: 0 | Status: SHIPPED | OUTPATIENT
Start: 2023-12-07 | End: 2024-01-03

## 2023-12-07 RX ORDER — INSULIN GLARGINE 100 [IU]/ML
50 INJECTION, SOLUTION SUBCUTANEOUS DAILY
Qty: 45 ML | Refills: 3 | Status: SHIPPED | OUTPATIENT
Start: 2023-12-07 | End: 2023-12-13

## 2023-12-07 RX ORDER — TAMSULOSIN HYDROCHLORIDE 0.4 MG/1
1 CAPSULE ORAL DAILY
Qty: 90 CAPSULE | Refills: 1 | Status: SHIPPED | OUTPATIENT
Start: 2023-12-07 | End: 2024-06-04

## 2023-12-07 RX ORDER — TADALAFIL 5 MG/1
20 TABLET ORAL DAILY
Qty: 360 TABLET | Refills: 3 | Status: SHIPPED | OUTPATIENT
Start: 2023-12-07 | End: 2024-12-06

## 2023-12-07 RX ORDER — (INSULIN DEGLUDEC AND LIRAGLUTIDE) 100; 3.6 [IU]/ML; MG/ML
40 INJECTION, SOLUTION SUBCUTANEOUS DAILY
Qty: 12 PEN | Refills: 1 | Status: SHIPPED | OUTPATIENT
Start: 2023-12-07 | End: 2023-12-07

## 2023-12-13 ENCOUNTER — PATIENT MESSAGE (OUTPATIENT)
Dept: FAMILY MEDICINE | Facility: CLINIC | Age: 53
End: 2023-12-13
Payer: COMMERCIAL

## 2023-12-13 RX ORDER — INSULIN DEGLUDEC 200 U/ML
50 INJECTION, SOLUTION SUBCUTANEOUS DAILY
Qty: 9 PEN | Refills: 2 | Status: SHIPPED | OUTPATIENT
Start: 2023-12-13 | End: 2024-12-12

## 2023-12-18 RX ORDER — PHENTERMINE HYDROCHLORIDE 37.5 MG/1
37.5 TABLET ORAL DAILY
Qty: 30 TABLET | Refills: 3 | Status: SHIPPED | OUTPATIENT
Start: 2023-12-18 | End: 2024-03-17

## 2023-12-21 ENCOUNTER — DOCUMENTATION ONLY (OUTPATIENT)
Dept: ADMINISTRATIVE | Facility: HOSPITAL | Age: 53
End: 2023-12-21
Payer: COMMERCIAL

## 2023-12-21 NOTE — PROGRESS NOTES
Population Health Chart Review & Patient Outreach Details:      Health Maintenance Topics Addressed and Outreach Outcomes / Actions Taken:            Urine Microalbumin []  Overdue Lab(s) Ordered    []  Overdue Lab(s) Scheduled    [x]  External Records Uploaded & Care Team Updated if Applicable    []  Primary Care Follow Up Visit Scheduled     []  Reminded Patient to Complete A1c Home Test    []  Patient Declined Scheduling Labs or Will Call Back to Schedule    []  Patient Will Schedule with External Provider / Order Routed, & Care Team Updated if Applicable          Renae England MA - Panel Care Coordinator

## 2024-01-02 DIAGNOSIS — E11.9 TYPE 2 DIABETES MELLITUS WITHOUT COMPLICATION, WITHOUT LONG-TERM CURRENT USE OF INSULIN: Primary | ICD-10-CM

## 2024-01-02 RX ORDER — PEN NEEDLE, DIABETIC 31 GX3/16"
NEEDLE, DISPOSABLE MISCELLANEOUS
Qty: 100 EACH | Refills: 11 | Status: SHIPPED | OUTPATIENT
Start: 2024-01-02 | End: 2024-01-03 | Stop reason: SDUPTHER

## 2024-01-03 ENCOUNTER — OFFICE VISIT (OUTPATIENT)
Dept: FAMILY MEDICINE | Facility: CLINIC | Age: 54
End: 2024-01-03
Payer: COMMERCIAL

## 2024-01-03 ENCOUNTER — PATIENT MESSAGE (OUTPATIENT)
Dept: FAMILY MEDICINE | Facility: CLINIC | Age: 54
End: 2024-01-03

## 2024-01-03 VITALS
HEART RATE: 87 BPM | OXYGEN SATURATION: 97 % | WEIGHT: 225 LBS | DIASTOLIC BLOOD PRESSURE: 88 MMHG | BODY MASS INDEX: 31.68 KG/M2 | SYSTOLIC BLOOD PRESSURE: 158 MMHG | TEMPERATURE: 98 F

## 2024-01-03 DIAGNOSIS — J32.9 SINUSITIS, UNSPECIFIED CHRONICITY, UNSPECIFIED LOCATION: ICD-10-CM

## 2024-01-03 DIAGNOSIS — E11.9 TYPE 2 DIABETES MELLITUS WITHOUT COMPLICATION, WITHOUT LONG-TERM CURRENT USE OF INSULIN: Primary | ICD-10-CM

## 2024-01-03 DIAGNOSIS — R09.81 CONGESTION OF NASAL SINUS: Primary | ICD-10-CM

## 2024-01-03 DIAGNOSIS — E11.9 TYPE 2 DIABETES MELLITUS WITHOUT COMPLICATION, WITHOUT LONG-TERM CURRENT USE OF INSULIN: ICD-10-CM

## 2024-01-03 LAB
CTP QC/QA: YES
CTP QC/QA: YES
FLUAV AG NPH QL: NEGATIVE
FLUBV AG NPH QL: NEGATIVE
SARS-COV-2 AG RESP QL IA.RAPID: NEGATIVE

## 2024-01-03 PROCEDURE — 99214 OFFICE O/P EST MOD 30 MIN: CPT | Mod: ,,, | Performed by: FAMILY MEDICINE

## 2024-01-03 PROCEDURE — 87400 INFLUENZA A/B EACH AG IA: CPT | Mod: 59,QW,, | Performed by: FAMILY MEDICINE

## 2024-01-03 PROCEDURE — 1159F MED LIST DOCD IN RCRD: CPT | Mod: CPTII,,, | Performed by: FAMILY MEDICINE

## 2024-01-03 PROCEDURE — 3077F SYST BP >= 140 MM HG: CPT | Mod: CPTII,,, | Performed by: FAMILY MEDICINE

## 2024-01-03 PROCEDURE — 87811 SARS-COV-2 COVID19 W/OPTIC: CPT | Mod: QW,,, | Performed by: FAMILY MEDICINE

## 2024-01-03 PROCEDURE — 3079F DIAST BP 80-89 MM HG: CPT | Mod: CPTII,,, | Performed by: FAMILY MEDICINE

## 2024-01-03 PROCEDURE — 3008F BODY MASS INDEX DOCD: CPT | Mod: CPTII,,, | Performed by: FAMILY MEDICINE

## 2024-01-03 RX ORDER — PEN NEEDLE, DIABETIC 30 GX3/16"
NEEDLE, DISPOSABLE MISCELLANEOUS
Qty: 100 EACH | Refills: 11 | Status: SHIPPED | OUTPATIENT
Start: 2024-01-03

## 2024-01-03 RX ORDER — CEFDINIR 300 MG/1
300 CAPSULE ORAL 2 TIMES DAILY
Qty: 20 CAPSULE | Refills: 0 | Status: SHIPPED | OUTPATIENT
Start: 2024-01-03 | End: 2024-01-13

## 2024-01-03 RX ORDER — PEN NEEDLE, DIABETIC 30 GX3/16"
NEEDLE, DISPOSABLE MISCELLANEOUS
Qty: 100 EACH | Refills: 11 | Status: SHIPPED | OUTPATIENT
Start: 2024-01-03 | End: 2024-01-03 | Stop reason: SDUPTHER

## 2024-01-03 RX ORDER — BROMPHENIRAMINE MALEATE AND PHENYLEPHRINE HYDROCHLORIDE 4; 10 MG/1; MG/1
1 TABLET, COATED ORAL EVERY 6 HOURS PRN
Qty: 30 EACH | Refills: 0 | Status: SHIPPED | OUTPATIENT
Start: 2024-01-03 | End: 2024-01-13

## 2024-01-03 NOTE — PROGRESS NOTES
SUBJECTIVE:  Juanita Rosa is a 53 y.o. male here for Sinusitis and Headache      HPI  Patient here for follow-up on his diabetes but also has been sick the last 3 days.  He is having sinus congestion and burning, pressure in his face, cough.  He denies fever.  As far as the diabetes he feels the tirzepatide is working well.  He would some initial nausea the 1st week but better now.  He is having some low blood sugars in the morning in his still on his 50 units of insulin long-acting daily.  Bruces allergies, medications, history, and problem list were updated as appropriate.    Review of Systems   A comprehensive review of symptoms was completed and negative except as noted above.    Recent Results (from the past 504 hour(s))   POCT Influenza A/B Rapid Antigen    Collection Time: 01/03/24 10:15 AM   Result Value Ref Range    Rapid Influenza A Ag Negative Negative    Rapid Influenza B Ag Negative Negative     Acceptable Yes    SARS Coronavirus 2 Antigen, POCT Manual Read    Collection Time: 01/03/24 10:15 AM   Result Value Ref Range    SARS Coronavirus 2 Antigen Negative Negative     Acceptable Yes        OBJECTIVE:  Vital signs  Vitals:    01/03/24 1002 01/03/24 1005   BP: (!) 162/88 (!) 158/88   BP Location: Left arm Right arm   Patient Position: Sitting Sitting   Pulse: 87    Temp: 97.7 °F (36.5 °C)    TempSrc: Temporal    SpO2: 97%    Weight: 102.1 kg (225 lb)         Physical Exam nares with erythema, tympanic membranes with cloudy fluid, oropharynx normal, lungs are clear    ASSESSMENT/PLAN:  1. Congestion of nasal sinus  Flu and COVID testing negative  -     POCT Influenza A/B Rapid Antigen  -     SARS Coronavirus 2 Antigen, POCT Manual Read    2. Type 2 diabetes mellitus without complication, without long-term current use of insulin  Increase tirzepatide to 5 mg weekly.  Decrease long-acting insulin to 40 units daily  Overview:  Lab Results   Component Value Date    HGBA1C 7.1  "(H) 11/15/2023    HGBA1C 7.5 (H) 05/04/2023    HGBA1C 7.4 (H) 09/10/2021        Orders:  -     pen needle, diabetic (SURE COMFORT PEN NEEDLE) 31 gauge x 3/16" Ndle; USE ONCE DAILY WITH INSULIN  Dispense: 100 each; Refill: 11    3. Sinusitis, unspecified chronicity, unspecified location  Cefdinir for 10 days    Other orders  -     cefdinir (OMNICEF) 300 MG capsule; Take 1 capsule (300 mg total) by mouth 2 (two) times daily. for 10 days  Dispense: 20 capsule; Refill: 0  -     brompheniramine-phenylephrine (RU-HIST D) 4-10 mg Tab; Take 1 each by mouth every 6 (six) hours as needed.  Dispense: 30 each; Refill: 0  -     tirzepatide 5 mg/0.5 mL PnIj; Inject 5 mg into the skin every 7 days.  Dispense: 4 pen ; Refill: 2         Follow Up:  Follow up in about 1 month (around 2/3/2024).            "

## 2024-02-09 DIAGNOSIS — E78.5 HYPERLIPIDEMIA, UNSPECIFIED HYPERLIPIDEMIA TYPE: Primary | ICD-10-CM

## 2024-02-09 DIAGNOSIS — I10 HYPERTENSION, UNSPECIFIED TYPE: ICD-10-CM

## 2024-02-09 RX ORDER — ROSUVASTATIN CALCIUM 10 MG/1
10 TABLET, COATED ORAL
Qty: 90 TABLET | Refills: 3 | Status: SHIPPED | OUTPATIENT
Start: 2024-02-09

## 2024-02-09 RX ORDER — LISINOPRIL 10 MG/1
10 TABLET ORAL
Qty: 90 TABLET | Refills: 1 | Status: SHIPPED | OUTPATIENT
Start: 2024-02-09

## 2024-02-23 ENCOUNTER — OFFICE VISIT (OUTPATIENT)
Dept: FAMILY MEDICINE | Facility: CLINIC | Age: 54
End: 2024-02-23
Payer: COMMERCIAL

## 2024-02-23 VITALS
HEART RATE: 100 BPM | BODY MASS INDEX: 30.05 KG/M2 | OXYGEN SATURATION: 98 % | WEIGHT: 214.63 LBS | HEIGHT: 71 IN | DIASTOLIC BLOOD PRESSURE: 70 MMHG | SYSTOLIC BLOOD PRESSURE: 110 MMHG | TEMPERATURE: 98 F

## 2024-02-23 DIAGNOSIS — Z79.4 TYPE 2 DIABETES MELLITUS WITHOUT COMPLICATION, WITH LONG-TERM CURRENT USE OF INSULIN: Primary | ICD-10-CM

## 2024-02-23 DIAGNOSIS — E11.9 TYPE 2 DIABETES MELLITUS WITHOUT COMPLICATION, WITH LONG-TERM CURRENT USE OF INSULIN: Primary | ICD-10-CM

## 2024-02-23 PROCEDURE — 3074F SYST BP LT 130 MM HG: CPT | Mod: CPTII,,, | Performed by: FAMILY MEDICINE

## 2024-02-23 PROCEDURE — 4010F ACE/ARB THERAPY RXD/TAKEN: CPT | Mod: CPTII,,, | Performed by: FAMILY MEDICINE

## 2024-02-23 PROCEDURE — 1159F MED LIST DOCD IN RCRD: CPT | Mod: CPTII,,, | Performed by: FAMILY MEDICINE

## 2024-02-23 PROCEDURE — 3008F BODY MASS INDEX DOCD: CPT | Mod: CPTII,,, | Performed by: FAMILY MEDICINE

## 2024-02-23 PROCEDURE — 3078F DIAST BP <80 MM HG: CPT | Mod: CPTII,,, | Performed by: FAMILY MEDICINE

## 2024-02-23 PROCEDURE — 99213 OFFICE O/P EST LOW 20 MIN: CPT | Mod: ,,, | Performed by: FAMILY MEDICINE

## 2024-02-23 RX ORDER — SOD SULF/POT CHLORIDE/MAG SULF 1.479 G
TABLET ORAL
COMMUNITY
Start: 2024-01-05 | End: 2024-03-11 | Stop reason: CLARIF

## 2024-02-23 NOTE — PROGRESS NOTES
"SUBJECTIVE:  Juanita Rosa is a 53 y.o. male here for Follow-up (medication) and Otalgia (right)      HPI  Patient here for follow-up on diabetes.  He has also been having some right ear pain.  Bruces allergies, medications, history, and problem list were updated as appropriate.    Review of Systems   See HPI.    No results found for this or any previous visit (from the past 504 hour(s)).    OBJECTIVE:  Vital signs  Vitals:    02/23/24 0943   BP: 110/70   BP Location: Left arm   Patient Position: Sitting   BP Method: Medium (Manual)   Pulse: 100   Temp: 97.6 °F (36.4 °C)   TempSrc: Oral   SpO2: 98%   Weight: 97.3 kg (214 lb 9.6 oz)   Height: 5' 10.67" (1.795 m)        Physical Exam tympanic membranes are clear bilateral.  No pain over the temporomandibular joint.    ASSESSMENT/PLAN:  1. Type 2 diabetes mellitus without complication, with long-term current use of insulin  Doing better with tirzepatide.  We will increase dose to 7.5 mg weekly.  He is still on Tresiba 40 units daily we will have to watch for hypoglycemia and may need to cut back as he increases the tirzepatide  Overview:  Lab Results   Component Value Date    HGBA1C 7.1 (H) 11/15/2023    HGBA1C 7.5 (H) 05/04/2023    HGBA1C 7.4 (H) 09/10/2021        Orders:  -     tirzepatide 7.5 mg/0.5 mL PnIj; Inject 7.5 mg into the skin every 7 days.  Dispense: 12 pen ; Refill: 1     Otalgia - this maybe dental related and he has an appointment soon with the dentist    Follow Up:  Follow up in about 3 months (around 5/23/2024) for recheck, fasting labs.            "

## 2024-03-11 ENCOUNTER — TELEPHONE (OUTPATIENT)
Dept: FAMILY MEDICINE | Facility: CLINIC | Age: 54
End: 2024-03-11
Payer: COMMERCIAL

## 2024-03-11 ENCOUNTER — HOSPITAL ENCOUNTER (INPATIENT)
Facility: HOSPITAL | Age: 54
LOS: 2 days | Discharge: HOME | DRG: 392 | End: 2024-03-13
Attending: FAMILY MEDICINE | Admitting: FAMILY MEDICINE
Payer: COMMERCIAL

## 2024-03-11 DIAGNOSIS — R11.2 NAUSEA AND VOMITING, UNSPECIFIED VOMITING TYPE: ICD-10-CM

## 2024-03-11 DIAGNOSIS — A41.9 SEPSIS, DUE TO UNSPECIFIED ORGANISM, UNSPECIFIED WHETHER ACUTE ORGAN DYSFUNCTION PRESENT: Primary | ICD-10-CM

## 2024-03-11 DIAGNOSIS — K52.9 COLITIS: ICD-10-CM

## 2024-03-11 PROBLEM — E83.42 HYPOMAGNESEMIA: Status: ACTIVE | Noted: 2024-03-11

## 2024-03-11 PROBLEM — E87.20 METABOLIC ACIDOSIS: Status: ACTIVE | Noted: 2024-03-11

## 2024-03-11 PROBLEM — F32.A DEPRESSION: Status: ACTIVE | Noted: 2024-03-11

## 2024-03-11 LAB
ALBUMIN SERPL-MCNC: 5 G/DL (ref 3.4–5)
ALBUMIN/GLOB SERPL: 1.5 RATIO
ALP SERPL-CCNC: 79 UNIT/L (ref 50–144)
ALT SERPL-CCNC: 26 UNIT/L (ref 1–45)
ANION GAP SERPL CALC-SCNC: 18 MEQ/L (ref 2–13)
AST SERPL-CCNC: 21 UNIT/L (ref 17–59)
BASOPHILS # BLD AUTO: 0.06 X10(3)/MCL (ref 0.01–0.08)
BASOPHILS NFR BLD AUTO: 0.3 % (ref 0.1–1.2)
BILIRUB SERPL-MCNC: 1.3 MG/DL (ref 0–1)
BUN SERPL-MCNC: 28 MG/DL (ref 7–20)
CALCIUM SERPL-MCNC: 10 MG/DL (ref 8.4–10.2)
CHLORIDE SERPL-SCNC: 107 MMOL/L (ref 98–110)
CO2 SERPL-SCNC: 14 MMOL/L (ref 21–32)
CREAT SERPL-MCNC: 1.23 MG/DL (ref 0.66–1.25)
CREAT/UREA NIT SERPL: 23 (ref 12–20)
EOSINOPHIL # BLD AUTO: 0.11 X10(3)/MCL (ref 0.04–0.54)
EOSINOPHIL NFR BLD AUTO: 0.5 % (ref 0.7–7)
ERYTHROCYTE [DISTWIDTH] IN BLOOD BY AUTOMATED COUNT: 12.9 %
GFR SERPLBLD CREATININE-BSD FMLA CKD-EPI: 70 MLS/MIN/1.73/M2
GLOBULIN SER-MCNC: 3.3 GM/DL (ref 2–3.9)
GLUCOSE SERPL-MCNC: 132 MG/DL (ref 70–115)
HCT VFR BLD AUTO: 54.6 % (ref 36–52)
HGB BLD-MCNC: 18.6 G/DL (ref 13–18)
IMM GRANULOCYTES # BLD AUTO: 0.11 X10(3)/MCL (ref 0–0.03)
IMM GRANULOCYTES NFR BLD AUTO: 0.5 % (ref 0–0.5)
LACTATE SERPL-SCNC: 1 MMOL/L (ref 0.4–2)
LYMPHOCYTES # BLD AUTO: 2.12 X10(3)/MCL (ref 1.32–3.57)
LYMPHOCYTES NFR BLD AUTO: 10.4 % (ref 20–55)
MAGNESIUM SERPL-MCNC: 1.7 MG/DL (ref 1.8–2.4)
MCH RBC QN AUTO: 30 PG (ref 27–34)
MCHC RBC AUTO-ENTMCNC: 34.1 G/DL (ref 31–37)
MCV RBC AUTO: 88.1 FL (ref 79–99)
MONOCYTES # BLD AUTO: 1.66 X10(3)/MCL (ref 0.3–0.82)
MONOCYTES NFR BLD AUTO: 8.1 % (ref 4.7–12.5)
NEUTROPHILS # BLD AUTO: 16.35 X10(3)/MCL (ref 1.78–5.38)
NEUTROPHILS NFR BLD AUTO: 80.2 % (ref 37–73)
NRBC BLD AUTO-RTO: 0 %
PLATELET # BLD AUTO: 363 X10(3)/MCL (ref 140–371)
PMV BLD AUTO: 9.5 FL (ref 9.4–12.4)
POCT GLUCOSE: 116 MG/DL (ref 70–110)
POTASSIUM SERPL-SCNC: 5 MMOL/L (ref 3.5–5.1)
PROT SERPL-MCNC: 8.3 GM/DL (ref 6.3–8.2)
RBC # BLD AUTO: 6.2 X10(6)/MCL (ref 4–6)
SODIUM SERPL-SCNC: 139 MMOL/L (ref 135–145)
TROPONIN I SERPL-MCNC: <0.012 NG/ML (ref 0–0.03)
WBC # SPEC AUTO: 20.41 X10(3)/MCL (ref 4–11.5)

## 2024-03-11 PROCEDURE — 63600175 PHARM REV CODE 636 W HCPCS

## 2024-03-11 PROCEDURE — 21400001 HC TELEMETRY ROOM

## 2024-03-11 PROCEDURE — 25000003 PHARM REV CODE 250

## 2024-03-11 PROCEDURE — 25500020 PHARM REV CODE 255: Performed by: FAMILY MEDICINE

## 2024-03-11 PROCEDURE — 96367 TX/PROPH/DG ADDL SEQ IV INF: CPT

## 2024-03-11 PROCEDURE — 25000003 PHARM REV CODE 250: Performed by: INTERNAL MEDICINE

## 2024-03-11 PROCEDURE — 63600175 PHARM REV CODE 636 W HCPCS: Performed by: INTERNAL MEDICINE

## 2024-03-11 PROCEDURE — 99285 EMERGENCY DEPT VISIT HI MDM: CPT | Mod: 25

## 2024-03-11 PROCEDURE — 82962 GLUCOSE BLOOD TEST: CPT

## 2024-03-11 PROCEDURE — 11000001 HC ACUTE MED/SURG PRIVATE ROOM

## 2024-03-11 PROCEDURE — 96361 HYDRATE IV INFUSION ADD-ON: CPT

## 2024-03-11 PROCEDURE — 84484 ASSAY OF TROPONIN QUANT: CPT | Performed by: FAMILY MEDICINE

## 2024-03-11 PROCEDURE — 85025 COMPLETE CBC W/AUTO DIFF WBC: CPT | Performed by: FAMILY MEDICINE

## 2024-03-11 PROCEDURE — 96366 THER/PROPH/DIAG IV INF ADDON: CPT

## 2024-03-11 PROCEDURE — 80053 COMPREHEN METABOLIC PANEL: CPT | Performed by: FAMILY MEDICINE

## 2024-03-11 PROCEDURE — 83605 ASSAY OF LACTIC ACID: CPT | Performed by: FAMILY MEDICINE

## 2024-03-11 PROCEDURE — 96375 TX/PRO/DX INJ NEW DRUG ADDON: CPT

## 2024-03-11 PROCEDURE — 96365 THER/PROPH/DIAG IV INF INIT: CPT

## 2024-03-11 PROCEDURE — 63600175 PHARM REV CODE 636 W HCPCS: Performed by: FAMILY MEDICINE

## 2024-03-11 PROCEDURE — 25000003 PHARM REV CODE 250: Performed by: FAMILY MEDICINE

## 2024-03-11 PROCEDURE — 96368 THER/DIAG CONCURRENT INF: CPT

## 2024-03-11 PROCEDURE — 83735 ASSAY OF MAGNESIUM: CPT | Performed by: FAMILY MEDICINE

## 2024-03-11 PROCEDURE — 94761 N-INVAS EAR/PLS OXIMETRY MLT: CPT

## 2024-03-11 PROCEDURE — 87040 BLOOD CULTURE FOR BACTERIA: CPT | Performed by: FAMILY MEDICINE

## 2024-03-11 RX ORDER — IBUPROFEN 200 MG
24 TABLET ORAL
Status: DISCONTINUED | OUTPATIENT
Start: 2024-03-11 | End: 2024-03-13 | Stop reason: HOSPADM

## 2024-03-11 RX ORDER — ACETAMINOPHEN 325 MG/1
650 TABLET ORAL EVERY 8 HOURS PRN
Status: DISCONTINUED | OUTPATIENT
Start: 2024-03-11 | End: 2024-03-13 | Stop reason: HOSPADM

## 2024-03-11 RX ORDER — ONDANSETRON HYDROCHLORIDE 2 MG/ML
4 INJECTION, SOLUTION INTRAVENOUS EVERY 8 HOURS PRN
Status: DISCONTINUED | OUTPATIENT
Start: 2024-03-11 | End: 2024-03-13 | Stop reason: HOSPADM

## 2024-03-11 RX ORDER — CIPROFLOXACIN 2 MG/ML
400 INJECTION, SOLUTION INTRAVENOUS
Status: DISCONTINUED | OUTPATIENT
Start: 2024-03-11 | End: 2024-03-13 | Stop reason: HOSPADM

## 2024-03-11 RX ORDER — METRONIDAZOLE 500 MG/100ML
500 INJECTION, SOLUTION INTRAVENOUS
Status: DISCONTINUED | OUTPATIENT
Start: 2024-03-12 | End: 2024-03-13 | Stop reason: HOSPADM

## 2024-03-11 RX ORDER — LISINOPRIL 10 MG/1
10 TABLET ORAL DAILY
Status: DISCONTINUED | OUTPATIENT
Start: 2024-03-11 | End: 2024-03-13 | Stop reason: HOSPADM

## 2024-03-11 RX ORDER — ONDANSETRON HYDROCHLORIDE 2 MG/ML
4 INJECTION, SOLUTION INTRAVENOUS
Status: COMPLETED | OUTPATIENT
Start: 2024-03-11 | End: 2024-03-11

## 2024-03-11 RX ORDER — IBUPROFEN 200 MG
16 TABLET ORAL
Status: DISCONTINUED | OUTPATIENT
Start: 2024-03-11 | End: 2024-03-13 | Stop reason: HOSPADM

## 2024-03-11 RX ORDER — TAMSULOSIN HYDROCHLORIDE 0.4 MG/1
1 CAPSULE ORAL DAILY
Status: DISCONTINUED | OUTPATIENT
Start: 2024-03-12 | End: 2024-03-13 | Stop reason: HOSPADM

## 2024-03-11 RX ORDER — INSULIN ASPART 100 [IU]/ML
0-5 INJECTION, SOLUTION INTRAVENOUS; SUBCUTANEOUS
Status: DISCONTINUED | OUTPATIENT
Start: 2024-03-11 | End: 2024-03-13 | Stop reason: HOSPADM

## 2024-03-11 RX ORDER — LISINOPRIL 10 MG/1
10 TABLET ORAL DAILY
Status: DISCONTINUED | OUTPATIENT
Start: 2024-03-12 | End: 2024-03-11

## 2024-03-11 RX ORDER — ATORVASTATIN CALCIUM 40 MG/1
40 TABLET, FILM COATED ORAL DAILY
Status: DISCONTINUED | OUTPATIENT
Start: 2024-03-12 | End: 2024-03-13 | Stop reason: HOSPADM

## 2024-03-11 RX ORDER — LANOLIN ALCOHOL/MO/W.PET/CERES
800 CREAM (GRAM) TOPICAL
Status: DISCONTINUED | OUTPATIENT
Start: 2024-03-11 | End: 2024-03-13 | Stop reason: HOSPADM

## 2024-03-11 RX ORDER — METRONIDAZOLE 500 MG/100ML
500 INJECTION, SOLUTION INTRAVENOUS
Status: COMPLETED | OUTPATIENT
Start: 2024-03-11 | End: 2024-03-11

## 2024-03-11 RX ORDER — FLUOXETINE HYDROCHLORIDE 20 MG/1
20 CAPSULE ORAL DAILY
Status: DISCONTINUED | OUTPATIENT
Start: 2024-03-12 | End: 2024-03-13 | Stop reason: HOSPADM

## 2024-03-11 RX ORDER — ENOXAPARIN SODIUM 100 MG/ML
40 INJECTION SUBCUTANEOUS EVERY 24 HOURS
Status: DISCONTINUED | OUTPATIENT
Start: 2024-03-11 | End: 2024-03-13 | Stop reason: HOSPADM

## 2024-03-11 RX ORDER — MAGNESIUM SULFATE HEPTAHYDRATE 40 MG/ML
2 INJECTION, SOLUTION INTRAVENOUS
Status: COMPLETED | OUTPATIENT
Start: 2024-03-11 | End: 2024-03-11

## 2024-03-11 RX ORDER — GLUCAGON 1 MG
1 KIT INJECTION
Status: DISCONTINUED | OUTPATIENT
Start: 2024-03-11 | End: 2024-03-13 | Stop reason: HOSPADM

## 2024-03-11 RX ORDER — IBUPROFEN 600 MG/1
600 TABLET ORAL EVERY 6 HOURS PRN
Status: DISCONTINUED | OUTPATIENT
Start: 2024-03-11 | End: 2024-03-13 | Stop reason: HOSPADM

## 2024-03-11 RX ORDER — PROCHLORPERAZINE EDISYLATE 5 MG/ML
5 INJECTION INTRAMUSCULAR; INTRAVENOUS EVERY 6 HOURS PRN
Status: DISCONTINUED | OUTPATIENT
Start: 2024-03-11 | End: 2024-03-13 | Stop reason: HOSPADM

## 2024-03-11 RX ORDER — MORPHINE SULFATE 2 MG/ML
2 INJECTION, SOLUTION INTRAMUSCULAR; INTRAVENOUS EVERY 4 HOURS PRN
Status: DISCONTINUED | OUTPATIENT
Start: 2024-03-11 | End: 2024-03-12

## 2024-03-11 RX ADMIN — ENOXAPARIN SODIUM 40 MG: 40 INJECTION SUBCUTANEOUS at 08:03

## 2024-03-11 RX ADMIN — IOHEXOL 100 ML: 300 INJECTION, SOLUTION INTRAVENOUS at 05:03

## 2024-03-11 RX ADMIN — PIPERACILLIN AND TAZOBACTAM 4.5 G: 4; .5 INJECTION, POWDER, LYOPHILIZED, FOR SOLUTION INTRAVENOUS; PARENTERAL at 05:03

## 2024-03-11 RX ADMIN — LISINOPRIL 10 MG: 10 TABLET ORAL at 08:03

## 2024-03-11 RX ADMIN — IBUPROFEN 600 MG: 600 TABLET ORAL at 08:03

## 2024-03-11 RX ADMIN — MAGNESIUM SULFATE HEPTAHYDRATE 2 G: 40 INJECTION, SOLUTION INTRAVENOUS at 05:03

## 2024-03-11 RX ADMIN — CIPROFLOXACIN 400 MG: 2 INJECTION, SOLUTION INTRAVENOUS at 08:03

## 2024-03-11 RX ADMIN — SODIUM CHLORIDE 1000 ML: 9 INJECTION, SOLUTION INTRAVENOUS at 04:03

## 2024-03-11 RX ADMIN — METRONIDAZOLE 500 MG: 5 INJECTION, SOLUTION INTRAVENOUS at 06:03

## 2024-03-11 RX ADMIN — ACETAMINOPHEN 650 MG: 325 TABLET, FILM COATED ORAL at 07:03

## 2024-03-11 RX ADMIN — ONDANSETRON 4 MG: 2 INJECTION INTRAMUSCULAR; INTRAVENOUS at 04:03

## 2024-03-11 RX ADMIN — SODIUM CHLORIDE, POTASSIUM CHLORIDE, SODIUM LACTATE AND CALCIUM CHLORIDE 1000 ML: 600; 310; 30; 20 INJECTION, SOLUTION INTRAVENOUS at 07:03

## 2024-03-11 NOTE — ASSESSMENT & PLAN NOTE
Continue SSI. Hold metformin, mounjaro, tresiba, and jardiance. Most recent hemoglobin A1c from 11/15/2023 was 7.1.

## 2024-03-11 NOTE — SUBJECTIVE & OBJECTIVE
"Past Medical History:   Diagnosis Date    BPH (benign prostatic hyperplasia)     Depression     Erectile dysfunction     History of colon polyps     History of pneumothorax     Hyperlipidemia     Hyperlipidemia     Type II diabetes mellitus        Past Surgical History:   Procedure Laterality Date    ANTERIOR CERVICAL DISCECTOMY W/ FUSION      CARPAL TUNNEL RELEASE Left     CHEST TUBE INSERTION      COLONOSCOPY      EXCISIONAL HEMORRHOIDECTOMY      LEFT HEART CATHETERIZATION      SHOULDER SURGERY Left     THORACIC DISCECTOMY      VASECTOMY         Review of patient's allergies indicates:   Allergen Reactions    Sulfa (sulfonamide antibiotics) Rash       No current facility-administered medications on file prior to encounter.     Current Outpatient Medications on File Prior to Encounter   Medication Sig    FLUoxetine 20 MG capsule Take 1 capsule (20 mg total) by mouth once daily.    insulin degludec (TRESIBA FLEXTOUCH U-200) 200 unit/mL (3 mL) insulin pen Inject 50 Units into the skin once daily.    JARDIANCE 25 mg tablet Take 1 tablet (25 mg total) by mouth once daily.    lisinopriL 10 MG tablet TAKE 1 TABLET DAILY    metFORMIN (GLUCOPHAGE) 1000 MG tablet TAKE 1 TABLET TWICE A DAY    phentermine (ADIPEX-P) 37.5 mg tablet Take 1 tablet (37.5 mg total) by mouth once daily.    rosuvastatin (CRESTOR) 10 MG tablet TAKE 1 TABLET ONCE DAILY    tadalafiL (CIALIS) 5 MG tablet Take 4 tablets (20 mg total) by mouth once daily.    tamsulosin (FLOMAX) 0.4 mg Cap Take 1 capsule (0.4 mg total) by mouth once daily.    tirzepatide 7.5 mg/0.5 mL PnIj Inject 7.5 mg into the skin every 7 days.    pen needle, diabetic (SURE COMFORT PEN NEEDLE) 31 gauge x 3/16" Ndle USE ONCE DAILY WITH INSULIN    [DISCONTINUED] sildenafiL (VIAGRA) 100 MG tablet take one tablet by mouth ONE HOUR BEFORE SEXUAL ACTIVITY AS DIRECTED    [DISCONTINUED] SUTAB 1.479-0.188- 0.225 gram tablet Take by mouth.     Family History       Problem Relation (Age of Onset) "    Diabetes Mother, Father    Heart disease Mother, Father        Social History:   Tobacco Use    Smoking status: Former     Current packs/day: 0.00     Types: Cigarettes     Quit date:      Years since quittin.2    Smokeless tobacco: Never   Substance and Sexual Activity    Alcohol use: Yes     Comment: social    Drug use: Never    Sexual activity: Not Currently     Review of Systems   Constitutional:  Positive for appetite change.   HENT: Negative.     Eyes: Negative.    Respiratory: Negative.     Cardiovascular: Negative.    Gastrointestinal:  Positive for abdominal distention, diarrhea, nausea and vomiting.   Endocrine: Negative.    Genitourinary: Negative.    Musculoskeletal: Negative.    Skin: Negative.    Allergic/Immunologic: Negative.    Neurological:  Positive for weakness.   Hematological: Negative.    Psychiatric/Behavioral: Negative.       Objective:     Vital Signs (Most Recent):  Temp: 98 °F (36.7 °C) (24 1534)  Pulse: 102 (24 1724)  Resp: 20 (24 1534)  BP: (!) 141/79 (24 1724)  SpO2: 100 % (24) Vital Signs (24h Range):  Temp:  [98 °F (36.7 °C)] 98 °F (36.7 °C)  Pulse:  [102-116] 102  Resp:  [20] 20  SpO2:  [95 %-100 %] 100 %  BP: (129-156)/(77-88) 141/79     Weight: 95.3 kg (210 lb)  Body mass index is 30.13 kg/m².     Physical Exam  General -  male in no acute distress.  HEENT - NCAT. No scleral icterus. Oropharynx clear. Mucous membranes moist.  Neck - No lymphadenopathy, thyromegaly, or JVD.  CVS - Tachycardic but regular rhythm. No murmurs, rubs, or gallops.  Resp - Lungs are clear to auscultation bilaterally. No rales, wheeze, or rhonchi.   GI - Soft, nontender, nondistended, normoactive bowel sounds present.   Extremities - No clubbing, cyanosis, or edema.   Neuro - CN II through XII are grossly intact. No focal neurological deficits. Alert and oriented times four.   Psych - Normal affect.  Skin - No rash, skin tear, or ulcer.          Significant Labs: All pertinent labs within the past 24 hours have been reviewed.  CBC:   Recent Labs   Lab 03/11/24  1623   WBC 20.41*   HGB 18.6*   HCT 54.6*        CMP:   Recent Labs   Lab 03/11/24  1623      K 5.0   CO2 14*   BUN 28.0*   CREATININE 1.23   CALCIUM 10.0   ALBUMIN 5.0   BILITOT 1.3*   ALKPHOS 79   AST 21   ALT 26     Magnesium:   Recent Labs   Lab 03/11/24  1623   MG 1.70*     Troponin:   Recent Labs   Lab 03/11/24  1623   TROPONINI <0.012       Significant Imaging: I have reviewed all pertinent imaging results/findings within the past 24 hours.  Imaging Results              CT Abdomen Pelvis With IV Contrast NO Oral Contrast (Final result)  Result time 03/11/24 17:37:50      Final result by Tomás Mera Jr., MD (03/11/24 17:37:50)                   Impression:      1. Colonic diverticulosis with mild colonic wall thickening.  A low-grade diverticulitis or colitis in the left lower quadrant would be in the differential.  No free air, abscess, or fluid collection noted.  2. Mild hyperemia of small bowel loops.  Gastroenteritis in the differential.  3. Normal appearance of the gallbladder and appendix.      Electronically signed by: Tomás Mera MD  Date:    03/11/2024  Time:    17:37               Narrative:    EXAMINATION:  CT ABDOMEN PELVIS WITH IV CONTRAST    CLINICAL HISTORY:  Abdominal abscess/infection suspected;    TECHNIQUE:  Low dose axial images, sagittal and coronal reformations were obtained from the lung bases to the pubic symphysis following the IV administration of 80 mL of Omnipaque 350    COMPARISON:  None.    RADIATION DOSE:  873 DLP.    Automated exposure control.    Iterative reconstruction was utilized.    FINDINGS:  The lung bases demonstrate pleuroparenchymal scarring with postoperative changes of a partial left lateral chest wall rib resection.    There is an air-fluid level in the stomach.  The liver, spleen, gallbladder, and pancreas are  unremarkable.  The adrenal glands are unremarkable.    The kidneys concentrate and excrete contrast normally.    No radiopaque renal calculus or hydronephrosis.    The small bowel loops are normal in caliber with mild hyperemia.  Gastroenteritis could have this appearance.    The appendix is visualized in the right lower quadrant and is unremarkable.    There is mild colonic wall thickening adjacent is several colonic diverticula in the left lower quadrant.  Colitis or diverticulitis would be in the differential.  No free air, abscess, or fluid collection noted.    The urinary bladder fills incompletely in the pelvis.    Soft tissue densities along the lower abdominal wall as demarcated by the arrow should be clinically evident.  Abdominal wall bruising would have this appearance.

## 2024-03-11 NOTE — ASSESSMENT & PLAN NOTE
Continue ciprofloxacin 400 mg IV every 12 hours, metronidazole 500 mg IV every 8 hours, IV fluids, and as needed antiemetics. Stool studies will be sent on admission and he will be started on a clear liquid diet which can be advanced as tolerated.

## 2024-03-11 NOTE — ED PROVIDER NOTES
Encounter Date: 3/11/2024       History     Chief Complaint   Patient presents with    Vomiting    Diarrhea    Bloated    Nausea    Gastroesophageal Reflux     Pt presented to ED per POV with c/o abdominal bloating, diarrhea and vomiting since Saturday. Pt reports blurred vision.     Patient presents for evaluation of nausea vomiting.  Patient notes having difficulty with nausea vomiting for the past 2 days.  Patient notes every time he tries to eat just comes up.  Patient denies having any obvious foreign body or food stuff stoppage.  Patient denies having any foreign body sensation stomach.  Patient denies diarrhea hematochezia melena.  Patient denies having any other aggravating or relieving features at present.    The history is provided by the patient.     Review of patient's allergies indicates:   Allergen Reactions    Sulfa (sulfonamide antibiotics) Rash     History reviewed. No pertinent past medical history.  Past Surgical History:   Procedure Laterality Date    CARPAL TUNNEL RELEASE Left     CERVICAL DISC SURGERY      HEMORRHOID SURGERY      LEFT HEART CATHETERIZATION      SHOULDER SURGERY Left     SPINE SURGERY       Family History   Problem Relation Age of Onset    Diabetes Mother     Heart disease Father     Diabetes Father      Social History     Tobacco Use    Smoking status: Former     Current packs/day: 0.00     Types: Cigarettes     Quit date:      Years since quittin.2    Smokeless tobacco: Never   Substance Use Topics    Alcohol use: Yes     Comment: social    Drug use: Never     Review of Systems   Constitutional: Negative.    HENT: Negative.     Eyes: Negative.    Respiratory: Negative.     Cardiovascular: Negative.    Gastrointestinal:  Positive for nausea and vomiting.   Endocrine: Negative.    Genitourinary: Negative.    Musculoskeletal: Negative.    Skin: Negative.    Allergic/Immunologic: Negative.    Neurological: Negative.    Hematological: Negative.    Psychiatric/Behavioral:  Negative.         Physical Exam     Initial Vitals [03/11/24 1534]   BP Pulse Resp Temp SpO2   135/79 (!) 116 20 98 °F (36.7 °C) 96 %      MAP       --         Physical Exam    Vitals reviewed.  Constitutional: He appears well-developed and well-nourished.   HENT:   Head: Normocephalic and atraumatic.   Eyes: Conjunctivae and EOM are normal. Pupils are equal, round, and reactive to light.   Neck: Neck supple.   Normal range of motion.  Cardiovascular:  Normal rate and regular rhythm.           Pulmonary/Chest: Breath sounds normal.   Abdominal: Abdomen is soft. Bowel sounds are normal.   Musculoskeletal:         General: Normal range of motion.      Cervical back: Normal range of motion and neck supple.     Neurological: He is alert and oriented to person, place, and time. He has normal reflexes. GCS score is 15. GCS eye subscore is 4. GCS verbal subscore is 5. GCS motor subscore is 6.   Skin: Skin is warm.   Psychiatric: He has a normal mood and affect.         ED Course   Procedures  Labs Reviewed   COMPREHENSIVE METABOLIC PANEL - Abnormal; Notable for the following components:       Result Value    Carbon Dioxide 14 (*)     Glucose Level 132 (*)     Blood Urea Nitrogen 28.0 (*)     Protein Total 8.3 (*)     Bilirubin Total 1.3 (*)     Anion Gap 18.0 (*)     BUN/Creatinine Ratio 23 (*)     All other components within normal limits   MAGNESIUM - Abnormal; Notable for the following components:    Magnesium Level 1.70 (*)     All other components within normal limits   CBC WITH DIFFERENTIAL - Abnormal; Notable for the following components:    WBC 20.41 (*)     RBC 6.20 (*)     Hgb 18.6 (*)     Hct 54.6 (*)     Neut % 80.2 (*)     Lymph % 10.4 (*)     Eos % 0.5 (*)     Neut # 16.35 (*)     Mono # 1.66 (*)     IG# 0.11 (*)     All other components within normal limits   POCT GLUCOSE - Abnormal; Notable for the following components:    POCT Glucose 116 (*)     All other components within normal limits   TROPONIN I -  Normal   LACTIC ACID, PLASMA - Normal   BLOOD CULTURE OLG   BLOOD CULTURE OLG   CBC W/ AUTO DIFFERENTIAL    Narrative:     The following orders were created for panel order CBC auto differential.  Procedure                               Abnormality         Status                     ---------                               -----------         ------                     CBC with Differential[[id number]]       Abnormal            Final result                 Please view results for these tests on the individual orders.   GI PANEL          Imaging Results              CT Abdomen Pelvis With IV Contrast NO Oral Contrast (Final result)  Result time 03/11/24 17:37:50      Final result by Tomás Mera Jr., MD (03/11/24 17:37:50)                   Impression:      1. Colonic diverticulosis with mild colonic wall thickening.  A low-grade diverticulitis or colitis in the left lower quadrant would be in the differential.  No free air, abscess, or fluid collection noted.  2. Mild hyperemia of small bowel loops.  Gastroenteritis in the differential.  3. Normal appearance of the gallbladder and appendix.      Electronically signed by: Tomás Mera MD  Date:    03/11/2024  Time:    17:37               Narrative:    EXAMINATION:  CT ABDOMEN PELVIS WITH IV CONTRAST    CLINICAL HISTORY:  Abdominal abscess/infection suspected;    TECHNIQUE:  Low dose axial images, sagittal and coronal reformations were obtained from the lung bases to the pubic symphysis following the IV administration of 80 mL of Omnipaque 350    COMPARISON:  None.    RADIATION DOSE:  873 DLP.    Automated exposure control.    Iterative reconstruction was utilized.    FINDINGS:  The lung bases demonstrate pleuroparenchymal scarring with postoperative changes of a partial left lateral chest wall rib resection.    There is an air-fluid level in the stomach.  The liver, spleen, gallbladder, and pancreas are unremarkable.  The adrenal glands are  unremarkable.    The kidneys concentrate and excrete contrast normally.    No radiopaque renal calculus or hydronephrosis.    The small bowel loops are normal in caliber with mild hyperemia.  Gastroenteritis could have this appearance.    The appendix is visualized in the right lower quadrant and is unremarkable.    There is mild colonic wall thickening adjacent is several colonic diverticula in the left lower quadrant.  Colitis or diverticulitis would be in the differential.  No free air, abscess, or fluid collection noted.    The urinary bladder fills incompletely in the pelvis.    Soft tissue densities along the lower abdominal wall as demarcated by the arrow should be clinically evident.  Abdominal wall bruising would have this appearance.                                       Medications   magnesium sulfate 2g in water 50mL IVPB (premix) (2 g Intravenous New Bag 3/11/24 1712)   metronidazole IVPB 500 mg (has no administration in time range)   sodium chloride 0.9% bolus 1,000 mL 1,000 mL (1,000 mLs Intravenous New Bag 3/11/24 1617)   ondansetron injection 4 mg (4 mg Intravenous Given 3/11/24 1617)   piperacillin-tazobactam (ZOSYN) 4.5 g in dextrose 5 % in water (D5W) 100 mL IVPB (MB+) (4.5 g Intravenous New Bag 3/11/24 1726)   iohexoL (OMNIPAQUE 300) injection 100 mL (100 mLs Intravenous Given 3/11/24 1723)     Medical Decision Making  Amount and/or Complexity of Data Reviewed  Labs: ordered.  Radiology: ordered.  Discussion of management or test interpretation with external provider(s): CT indicates colitis versus diverticulitis.  Patient meets sepsis criteria.    Risk  Prescription drug management.                                      Clinical Impression:  Final diagnoses:  [R11.2] Nausea and vomiting, unspecified vomiting type  [A41.9] Sepsis, due to unspecified organism, unspecified whether acute organ dysfunction present (Primary)  [K52.9] Colitis          ED Disposition Condition    Admit Stable                 Daniel Molina MD  03/11/24 1810

## 2024-03-11 NOTE — H&P
[facility] Medicine  Telehospitalist History & Physical    Patient Name: Juanita Rosa  MRN: [id number]  Patient Class: IP- Inpatient  Admission Date: 3/11/2024  Attending Physician: Abiodun Cleary MD   Primary Care Provider: Tony Monae MD       Patient information was obtained from patient and ER records.     Subjective:     Principal Problem:Acute colitis    Chief Complaint: Diarrhea.     HPI: Mr. Rosa is a 53 year old  male with a history of type II diabetes mellitus, HTN, hyperlipidemia, and BPH who presented to the ER today with a 2 day history of diarrhea. He was in his normal state of health until Saaturday 3/10/2024 when he developed nausea, vomiting x 2, belching, diarrhea with >20 loose stools daily, and decreased appetite. He had abdominal bloating yesterday but he denies any fevers, chills, blood in the stool, chest pain, shortness of breath, or cough. He denies any history of Clostridium difficile colitis or recent antibiotic use. He is scheduled for elective colonoscopy on Friday 3/15/2024. He became lethargic and weak today which is what prompted him to come to [facility] for further evaluation. On arrival to the ER he was tachycardic with a heart rate of 116 and hypertensive with a blood pressure of 156/77 and his initial lab work was for a WBC count of 20.41, CO2 of 14, BUN 28, creatinine of 1.23, magnesium of 1.7, and a glucose of 132.  CT of the abdomen pelvis showed colonic diverticulosis with mild colonic wall thickening and mild hyperemia of small bowel loops.   He has received zosyn 4.5 g IV, metronidazole 500 mg IV, ondansetron 4 mg IV, magnesium sulfate 2 g IV, and 3859 liters of IV fluids in the ER.  He is otherwise in stable condition and he has no other complaints today.    Past Medical History:   Diagnosis Date    BPH (benign prostatic hyperplasia)     Depression     Erectile dysfunction     History of colon  "polyps     History of pneumothorax     Hyperlipidemia     Hyperlipidemia     Type II diabetes mellitus        Past Surgical History:   Procedure Laterality Date    ANTERIOR CERVICAL DISCECTOMY W/ FUSION      CARPAL TUNNEL RELEASE Left     CHEST TUBE INSERTION      COLONOSCOPY      EXCISIONAL HEMORRHOIDECTOMY      LEFT HEART CATHETERIZATION      SHOULDER SURGERY Left     THORACIC DISCECTOMY      VASECTOMY         Review of patient's allergies indicates:   Allergen Reactions    Sulfa (sulfonamide antibiotics) Rash       No current facility-administered medications on file prior to encounter.     Current Outpatient Medications on File Prior to Encounter   Medication Sig    FLUoxetine 20 MG capsule Take 1 capsule (20 mg total) by mouth once daily.    insulin degludec (TRESIBA FLEXTOUCH U-200) 200 unit/mL (3 mL) insulin pen Inject 50 Units into the skin once daily.    JARDIANCE 25 mg tablet Take 1 tablet (25 mg total) by mouth once daily.    lisinopriL 10 MG tablet TAKE 1 TABLET DAILY    metFORMIN (GLUCOPHAGE) 1000 MG tablet TAKE 1 TABLET TWICE A DAY    phentermine (ADIPEX-P) 37.5 mg tablet Take 1 tablet (37.5 mg total) by mouth once daily.    rosuvastatin (CRESTOR) 10 MG tablet TAKE 1 TABLET ONCE DAILY    tadalafiL (CIALIS) 5 MG tablet Take 4 tablets (20 mg total) by mouth once daily.    tamsulosin (FLOMAX) 0.4 mg Cap Take 1 capsule (0.4 mg total) by mouth once daily.    tirzepatide 7.5 mg/0.5 mL PnIj Inject 7.5 mg into the skin every 7 days.    pen needle, diabetic (SURE COMFORT PEN NEEDLE) 31 gauge x 3/16" Ndle USE ONCE DAILY WITH INSULIN    [DISCONTINUED] sildenafiL (VIAGRA) 100 MG tablet take one tablet by mouth ONE HOUR BEFORE SEXUAL ACTIVITY AS DIRECTED    [DISCONTINUED] SUTAB 1.479-0.188- 0.225 gram tablet Take by mouth.     Family History       Problem Relation (Age of Onset)    Diabetes Mother, Father    Heart disease Mother, Father        Social History:   Tobacco Use    Smoking status: Former     Current " packs/day: 0.00     Types: Cigarettes     Quit date:      Years since quittin.2    Smokeless tobacco: Never   Substance and Sexual Activity    Alcohol use: Yes     Comment: social    Drug use: Never    Sexual activity: Not Currently     Review of Systems   Constitutional:  Positive for appetite change.   HENT: Negative.     Eyes: Negative.    Respiratory: Negative.     Cardiovascular: Negative.    Gastrointestinal:  Positive for abdominal distention, diarrhea, nausea and vomiting.   Endocrine: Negative.    Genitourinary: Negative.    Musculoskeletal: Negative.    Skin: Negative.    Allergic/Immunologic: Negative.    Neurological:  Positive for weakness.   Hematological: Negative.    Psychiatric/Behavioral: Negative.       Objective:     Vital Signs (Most Recent):  Temp: 98 °F (36.7 °C) (24 153)  Pulse: 102 (24 172)  Resp: 20 (24 153)  BP: (!) 141/79 (24)  SpO2: 100 % (24) Vital Signs (24h Range):  Temp:  [98 °F (36.7 °C)] 98 °F (36.7 °C)  Pulse:  [102-116] 102  Resp:  [20] 20  SpO2:  [95 %-100 %] 100 %  BP: (129-156)/(77-88) 141/79     Weight: 95.3 kg (210 lb)  Body mass index is 30.13 kg/m².     Physical Exam  General -  male in no acute distress.  HEENT - NCAT. No scleral icterus. Oropharynx clear. Mucous membranes moist.  Neck - No lymphadenopathy, thyromegaly, or JVD.  CVS - Tachycardic but regular rhythm. No murmurs, rubs, or gallops.  Resp - Lungs are clear to auscultation bilaterally. No rales, wheeze, or rhonchi.   GI - Soft, nontender, nondistended, normoactive bowel sounds present.   Extremities - No clubbing, cyanosis, or edema.   Neuro - CN II through XII are grossly intact. No focal neurological deficits. Alert and oriented times four.   Psych - Normal affect.  Skin - No rash, skin tear, or ulcer.         Significant Labs: All pertinent labs within the past 24 hours have been reviewed.  CBC:   Recent Labs   Lab 24  1623   WBC 20.41*    HGB 18.6*   HCT 54.6*        CMP:   Recent Labs   Lab 03/11/24  1623      K 5.0   CO2 14*   BUN 28.0*   CREATININE 1.23   CALCIUM 10.0   ALBUMIN 5.0   BILITOT 1.3*   ALKPHOS 79   AST 21   ALT 26     Magnesium:   Recent Labs   Lab 03/11/24  1623   MG 1.70*     Troponin:   Recent Labs   Lab 03/11/24  1623   TROPONINI <0.012       Significant Imaging: I have reviewed all pertinent imaging results/findings within the past 24 hours.  Imaging Results              CT Abdomen Pelvis With IV Contrast NO Oral Contrast (Final result)  Result time 03/11/24 17:37:50      Final result by Tomás Mera Jr., MD (03/11/24 17:37:50)                   Impression:      1. Colonic diverticulosis with mild colonic wall thickening.  A low-grade diverticulitis or colitis in the left lower quadrant would be in the differential.  No free air, abscess, or fluid collection noted.  2. Mild hyperemia of small bowel loops.  Gastroenteritis in the differential.  3. Normal appearance of the gallbladder and appendix.      Electronically signed by: Tomás Mera MD  Date:    03/11/2024  Time:    17:37               Narrative:    EXAMINATION:  CT ABDOMEN PELVIS WITH IV CONTRAST    CLINICAL HISTORY:  Abdominal abscess/infection suspected;    TECHNIQUE:  Low dose axial images, sagittal and coronal reformations were obtained from the lung bases to the pubic symphysis following the IV administration of 80 mL of Omnipaque 350    COMPARISON:  None.    RADIATION DOSE:  873 DLP.    Automated exposure control.    Iterative reconstruction was utilized.    FINDINGS:  The lung bases demonstrate pleuroparenchymal scarring with postoperative changes of a partial left lateral chest wall rib resection.    There is an air-fluid level in the stomach.  The liver, spleen, gallbladder, and pancreas are unremarkable.  The adrenal glands are unremarkable.    The kidneys concentrate and excrete contrast normally.    No radiopaque renal calculus  or hydronephrosis.    The small bowel loops are normal in caliber with mild hyperemia.  Gastroenteritis could have this appearance.    The appendix is visualized in the right lower quadrant and is unremarkable.    There is mild colonic wall thickening adjacent is several colonic diverticula in the left lower quadrant.  Colitis or diverticulitis would be in the differential.  No free air, abscess, or fluid collection noted.    The urinary bladder fills incompletely in the pelvis.    Soft tissue densities along the lower abdominal wall as demarcated by the arrow should be clinically evident.  Abdominal wall bruising would have this appearance.                                      Assessment/Plan:     * Acute colitis  Continue ciprofloxacin 400 mg IV every 12 hours, metronidazole 500 mg IV every 8 hours, IV fluids, and as needed antiemetics. Stool studies will be sent on admission and he will be started on a clear liquid diet which can be advanced as tolerated.     Sepsis  Continue IV antibiotics and IV fluids. 2 sets of blood cultures have been obtained in the ER.     Hypomagnesemia  He received magnesium sulfate 2 gm IV x 1 in the ER. Repeat magnesium in the morning.    Metabolic acidosis  Continue IV fluids.     Type II diabetes mellitus  Continue SSI. Hold metformin, mounjaro, tresiba, and jardiance. Most recent hemoglobin A1c from 11/15/2023 was 7.1.     Hypertension  Continue lisinopril.     Depression  Continue fluoxetine.    Hyperlipidemia  Continue atorvastatin.     BPH (benign prostatic hyperplasia)  Continue tamsulosin.      VTE Risk Mitigation (From admission, onward)           Ordered     enoxaparin injection 40 mg  Daily         03/11/24 1855     IP VTE HIGH RISK PATIENT  Once         03/11/24 1855                  This service was provided via telemedicine.   Type of Software: Audio/Visual.  Originating Site: [facility] ER.  Distant Site: [facility].  His exam was performed with  the assistance of his ER nurse.    Jatinder Mohamud MD  Department of Hospital Medicine  [facility]

## 2024-03-11 NOTE — HPI
Mr. Rosa is a 53 year old  male with a history of type II diabetes mellitus, HTN, hyperlipidemia, and BPH who presented to the ER today with a 2 day history of diarrhea. He was in his normal state of health until Saaturday 3/10/2024 when he developed nausea, vomiting x 2, belching, diarrhea with >20 loose stools daily, and decreased appetite. He had abdominal bloating yesterday but he denies any fevers, chills, blood in the stool, chest pain, shortness of breath, or cough. He denies any history of Clostridium difficile colitis or recent antibiotic use. He is scheduled for elective colonoscopy on Friday 3/15/2024. He became lethargic and weak today which is what prompted him to come to [facility] for further evaluation. On arrival to the ER he was tachycardic with a heart rate of 116 and hypertensive with a blood pressure of 156/77 and his initial lab work was for a WBC count of 20.41, CO2 of 14, BUN 28, creatinine of 1.23, magnesium of 1.7, and a glucose of 132.  CT of the abdomen pelvis showed colonic diverticulosis with mild colonic wall thickening and mild hyperemia of small bowel loops.   He has received zosyn 4.5 g IV, metronidazole 500 mg IV, ondansetron 4 mg IV, magnesium sulfate 2 g IV, and 3859 liters of IV fluids in the ER.  He is otherwise in stable condition and he has no other complaints today.

## 2024-03-12 LAB
ALBUMIN SERPL-MCNC: 3.4 G/DL (ref 3.4–5)
ALBUMIN/GLOB SERPL: 1.5 RATIO
ALP SERPL-CCNC: 66 UNIT/L (ref 50–144)
ALT SERPL-CCNC: 17 UNIT/L (ref 1–45)
ANION GAP SERPL CALC-SCNC: 9 MEQ/L (ref 2–13)
AST SERPL-CCNC: 12 UNIT/L (ref 17–59)
BASOPHILS # BLD AUTO: 0.03 X10(3)/MCL (ref 0.01–0.08)
BASOPHILS NFR BLD AUTO: 0.3 % (ref 0.1–1.2)
BILIRUB SERPL-MCNC: 1.3 MG/DL (ref 0–1)
BUN SERPL-MCNC: 20 MG/DL (ref 7–20)
CALCIUM SERPL-MCNC: 8.9 MG/DL (ref 8.4–10.2)
CHLORIDE SERPL-SCNC: 109 MMOL/L (ref 98–110)
CO2 SERPL-SCNC: 17 MMOL/L (ref 21–32)
CREAT SERPL-MCNC: 0.89 MG/DL (ref 0.66–1.25)
CREAT/UREA NIT SERPL: 22 (ref 12–20)
EOSINOPHIL # BLD AUTO: 0.34 X10(3)/MCL (ref 0.04–0.54)
EOSINOPHIL NFR BLD AUTO: 3.1 % (ref 0.7–7)
ERYTHROCYTE [DISTWIDTH] IN BLOOD BY AUTOMATED COUNT: 12.7 %
GFR SERPLBLD CREATININE-BSD FMLA CKD-EPI: >90 MLS/MIN/1.73/M2
GLOBULIN SER-MCNC: 2.3 GM/DL (ref 2–3.9)
GLUCOSE SERPL-MCNC: 98 MG/DL (ref 70–115)
HCT VFR BLD AUTO: 44.7 % (ref 36–52)
HGB BLD-MCNC: 15.2 G/DL (ref 13–18)
IMM GRANULOCYTES # BLD AUTO: 0.04 X10(3)/MCL (ref 0–0.03)
IMM GRANULOCYTES NFR BLD AUTO: 0.4 % (ref 0–0.5)
LYMPHOCYTES # BLD AUTO: 1.79 X10(3)/MCL (ref 1.32–3.57)
LYMPHOCYTES NFR BLD AUTO: 16.5 % (ref 20–55)
MAGNESIUM SERPL-MCNC: 1.8 MG/DL (ref 1.8–2.4)
MCH RBC QN AUTO: 29.7 PG (ref 27–34)
MCHC RBC AUTO-ENTMCNC: 34 G/DL (ref 31–37)
MCV RBC AUTO: 87.3 FL (ref 79–99)
MONOCYTES # BLD AUTO: 1.23 X10(3)/MCL (ref 0.3–0.82)
MONOCYTES NFR BLD AUTO: 11.3 % (ref 4.7–12.5)
NEUTROPHILS # BLD AUTO: 7.44 X10(3)/MCL (ref 1.78–5.38)
NEUTROPHILS NFR BLD AUTO: 68.4 % (ref 37–73)
NRBC BLD AUTO-RTO: 0 %
PHOSPHATE SERPL-MCNC: 2.5 MG/DL (ref 2.5–4.9)
PLATELET # BLD AUTO: 241 X10(3)/MCL (ref 140–371)
PMV BLD AUTO: 9.6 FL (ref 9.4–12.4)
POCT GLUCOSE: 100 MG/DL (ref 70–110)
POCT GLUCOSE: 102 MG/DL (ref 70–110)
POCT GLUCOSE: 151 MG/DL (ref 70–110)
POCT GLUCOSE: 95 MG/DL (ref 70–110)
POTASSIUM SERPL-SCNC: 4.5 MMOL/L (ref 3.5–5.1)
PROT SERPL-MCNC: 5.7 GM/DL (ref 6.3–8.2)
RBC # BLD AUTO: 5.12 X10(6)/MCL (ref 4–6)
SODIUM SERPL-SCNC: 135 MMOL/L (ref 135–145)
WBC # SPEC AUTO: 10.87 X10(3)/MCL (ref 4–11.5)

## 2024-03-12 PROCEDURE — 25000003 PHARM REV CODE 250: Performed by: INTERNAL MEDICINE

## 2024-03-12 PROCEDURE — 80053 COMPREHEN METABOLIC PANEL: CPT

## 2024-03-12 PROCEDURE — 25000003 PHARM REV CODE 250

## 2024-03-12 PROCEDURE — 63600175 PHARM REV CODE 636 W HCPCS: Mod: JZ,JG

## 2024-03-12 PROCEDURE — 63600175 PHARM REV CODE 636 W HCPCS: Performed by: INTERNAL MEDICINE

## 2024-03-12 PROCEDURE — 21400001 HC TELEMETRY ROOM

## 2024-03-12 PROCEDURE — 83735 ASSAY OF MAGNESIUM: CPT

## 2024-03-12 PROCEDURE — 36415 COLL VENOUS BLD VENIPUNCTURE: CPT

## 2024-03-12 PROCEDURE — 94761 N-INVAS EAR/PLS OXIMETRY MLT: CPT

## 2024-03-12 PROCEDURE — 85025 COMPLETE CBC W/AUTO DIFF WBC: CPT

## 2024-03-12 PROCEDURE — 84100 ASSAY OF PHOSPHORUS: CPT

## 2024-03-12 RX ORDER — MORPHINE SULFATE 2 MG/ML
2 INJECTION, SOLUTION INTRAMUSCULAR; INTRAVENOUS EVERY 4 HOURS PRN
Status: DISCONTINUED | OUTPATIENT
Start: 2024-03-12 | End: 2024-03-13 | Stop reason: HOSPADM

## 2024-03-12 RX ADMIN — METRONIDAZOLE 500 MG: 5 INJECTION, SOLUTION INTRAVENOUS at 02:03

## 2024-03-12 RX ADMIN — TAMSULOSIN HYDROCHLORIDE 0.4 MG: 0.4 CAPSULE ORAL at 08:03

## 2024-03-12 RX ADMIN — CIPROFLOXACIN 400 MG: 2 INJECTION, SOLUTION INTRAVENOUS at 07:03

## 2024-03-12 RX ADMIN — LISINOPRIL 10 MG: 10 TABLET ORAL at 08:03

## 2024-03-12 RX ADMIN — CIPROFLOXACIN 400 MG: 2 INJECTION, SOLUTION INTRAVENOUS at 08:03

## 2024-03-12 RX ADMIN — METRONIDAZOLE 500 MG: 5 INJECTION, SOLUTION INTRAVENOUS at 05:03

## 2024-03-12 RX ADMIN — METRONIDAZOLE 500 MG: 5 INJECTION, SOLUTION INTRAVENOUS at 10:03

## 2024-03-12 RX ADMIN — FLUOXETINE 20 MG: 20 CAPSULE ORAL at 08:03

## 2024-03-12 RX ADMIN — ENOXAPARIN SODIUM 40 MG: 40 INJECTION SUBCUTANEOUS at 05:03

## 2024-03-12 RX ADMIN — ATORVASTATIN CALCIUM 40 MG: 40 TABLET, FILM COATED ORAL at 08:03

## 2024-03-12 NOTE — PROGRESS NOTES
Inpatient Nutrition Evaluation    Admit Date: 3/11/2024   Total duration of encounter: 1 day    Nutrition Recommendation/Prescription     Continue Diabetic diet as medically appropriate.     Add Boost Glucose Control 1x/day (190 kcal, 16 gm pro) to aid in nutrition intake.      Continue to encourage PO intake and honor patient preferences.     Recommend consider MVI as medically appropriate.     Dietitian will monitor Electrolytes, Energy Intake, Food and Beverage Intake, Gastrointestinal Profile, Glucose/Endocrine Profile, Weight, and Weight Change and adjust MNT as needed.       Monitoring & Evaluation     Communication of Recommendations: reviewed with nurse, reviewed with patient, and reviewed with family    Reason Seen: continuous nutrition monitoring    Nutrition Risk/Follow-Up: Low (follow-up in 5-7 days)  Patients assigned 'Low nutrition risk' status do not qualify for a full nutritional assessment but will be monitored and re-evaluated in a 5-7 day time period. Please consult if re-evaluation needed sooner.    RD Follow-up?: Yes  Next Date to be Seen by RD: 03/18/24  Nutrition Assessment     Chart Review    Malnutrition Screening Tool Results   Have you recently lost weight without trying?: No  Have you been eating poorly because of a decreased appetite?: No   MST Score: 0     Diagnosis:  Acute colitis  Sepsis  Hypomagnesemia  Metabolic acidosis  T2DM  HTN  Depression  HLD  BPH    Past Medical History:   Diagnosis Date    BPH (benign prostatic hyperplasia)     Depression     Erectile dysfunction     History of colon polyps     History of pneumothorax     Hyperlipidemia     Hyperlipidemia     Hypertension     Type II diabetes mellitus      Past Surgical History:   Procedure Laterality Date    ANTERIOR CERVICAL DISCECTOMY W/ FUSION      CARPAL TUNNEL RELEASE Left     CHEST TUBE INSERTION      COLONOSCOPY      EXCISIONAL HEMORRHOIDECTOMY      LEFT HEART CATHETERIZATION      SHOULDER SURGERY Left     THORACIC  DISCECTOMY      VASECTOMY         Scheduled Medications:  atorvastatin, 40 mg, Daily  ciprofloxacin, 400 mg, Q12H  enoxparin, 40 mg, Daily  FLUoxetine, 20 mg, Daily  lisinopriL, 10 mg, Daily  metronidazole, 500 mg, Q8H  tamsulosin, 1 capsule, Daily    Continuous Infusions:   PRN Medications: acetaminophen, dextrose 10%, dextrose 10%, glucagon (human recombinant), glucose, glucose, ibuprofen, insulin aspart U-100, magnesium oxide, morphine, ondansetron, prochlorperazine    Calorie Containing IV Medications: no significant kcals from medications at this time    Nutrition-Related Labs:  Recent Labs   Lab 03/11/24  1623 03/12/24  0416    135   K 5.0 4.5   CALCIUM 10.0 8.9   PHOS  --  2.5   MG 1.70* 1.80   CHLORIDE 107 109   CO2 14* 17*   BUN 28.0* 20.0   CREATININE 1.23 0.89   EGFRNORACEVR 70 >90   GLUCOSE 132* 98   BILITOT 1.3* 1.3*   ALKPHOS 79 66   ALT 26 17   AST 21 12*   ALBUMIN 5.0 3.4   WBC 20.41* 10.87   HGB 18.6* 15.2   HCT 54.6* 44.7     CrCl:    Lab Value: 108.7    Date: 3/12    Nutrition Orders:  Diet diabetic    Other Oral Supplement Order: None/Already listed above  Appetite/Oral Intake: good/% of meals  Factors Affecting Nutritional Intake: abdominal pain and altered gastrointestinal function  Food/Restoration/Cultural Preferences:  Dislike: Fruits with seeds, Baked [place], Tomatoes, and Cucumbers. (Only eats corn, and green beans)  Food Allergies:   Review of patient's allergies indicates:   Allergen Reactions    Sulfa (sulfonamide antibiotics) Rash          Wound(s):       Overview/Hospital Course:    (3/12): Patient reports good appetite ate all of Lunch tray (Diabetic Diet) and ate 100% of 1 CLD per EMR. Patient did state that he has been unable to eat since Saturday ~3 days and has lost some weight unintentionally while also lost weight intentionally. Per EMR patient weighed 97.3 kg on 2/23/24, CBW- 93.1 kg showing a possible 9.2# (4.3%) within 18 days. However, its unclear how much was  "unintentional versus intentional but do to mixed RD to consider low risk for now. Patient is open to ONS and reports NKFA and no issues c/s. GI: NON DISTENDED and DISCOMFORT /LBM-3/11, : WDL, FUNCTIONAL SCREEN:Eatin - independent, Nutrition: 3-->adequate,Andrew Score: 22, NO EDEMA NOTED, 24 HR I/O: INCOMPLETE.      Anthropometrics    Height: 5' 10" (177.8 cm) Height Method: Stated  Last Weight: 93.1 kg (205 lb 3.2 oz) (24) Weight Method: Bed Scale  BMI (Calculated): 29.4  BMI Classification: overweight (BMI 25-29.9)        Ideal Body Weight (IBW), Male: 166 lb     % Ideal Body Weight, Male (lb): 123.61 %                          Usual Weight Provided By: patient and EMR weight history    Wt Readings from Last 5 Encounters:   24 93.1 kg (205 lb 3.2 oz)   24 97.3 kg (214 lb 9.6 oz)   24 102.1 kg (225 lb)   23 101.2 kg (223 lb)   23 101.6 kg (224 lb)     Weight Change(s) Since Admission:  Admit Weight: 95.3 kg (210 lb) (24 1534)      Patient Education    Not applicable.          "

## 2024-03-12 NOTE — PLAN OF CARE
Problem: Adult Inpatient Plan of Care  Goal: Plan of Care Review  3/11/2024 2207 by Nica Nixon RN  Outcome: Ongoing, Progressing  3/11/2024 2205 by Nica Nixon RN  Outcome: Ongoing, Progressing  Goal: Patient-Specific Goal (Individualized)  3/11/2024 2207 by Nica Nixon RN  Outcome: Ongoing, Progressing  3/11/2024 2205 by Nica Nixon RN  Outcome: Ongoing, Progressing  Goal: Absence of Hospital-Acquired Illness or Injury  3/11/2024 2207 by Nica Nixon RN  Outcome: Ongoing, Progressing  3/11/2024 2205 by Nica Nixon RN  Outcome: Ongoing, Progressing  Goal: Optimal Comfort and Wellbeing  3/11/2024 2207 by Nica Nixon RN  Outcome: Ongoing, Progressing  3/11/2024 2205 by Nica Nixon RN  Outcome: Ongoing, Progressing  Goal: Readiness for Transition of Care  3/11/2024 2207 by Nica Nixon RN  Outcome: Ongoing, Progressing  3/11/2024 2205 by Nica Nixon RN  Outcome: Ongoing, Progressing     Problem: Adult Inpatient Plan of Care  Goal: Patient-Specific Goal (Individualized)  3/11/2024 2207 by Nica Nixon RN  Outcome: Ongoing, Progressing  3/11/2024 2205 by Nica Nixon RN  Outcome: Ongoing, Progressing     Problem: Adult Inpatient Plan of Care  Goal: Absence of Hospital-Acquired Illness or Injury  3/11/2024 2207 by Nica Nioxn RN  Outcome: Ongoing, Progressing  3/11/2024 2205 by Nica Nixon RN  Outcome: Ongoing, Progressing     Problem: Adult Inpatient Plan of Care  Goal: Optimal Comfort and Wellbeing  3/11/2024 2207 by Nica Nixon RN  Outcome: Ongoing, Progressing  3/11/2024 2205 by Nica Nixon RN  Outcome: Ongoing, Progressing     Problem: Adult Inpatient Plan of Care  Goal: Readiness for Transition of Care  3/11/2024 2207 by Nica Nixon RN  Outcome: Ongoing, Progressing  3/11/2024 2205 by Nica Nixon RN  Outcome: Ongoing, Progressing     Problem: Diabetes  Comorbidity  Goal: Blood Glucose Level Within Targeted Range  3/11/2024 2207 by Nica Nixon RN  Outcome: Ongoing, Progressing  3/11/2024 2205 by Nica Nixon RN  Outcome: Ongoing, Progressing     Problem: Adjustment to Illness (Sepsis/Septic Shock)  Goal: Optimal Coping  3/11/2024 2207 by Nica Nixon RN  Outcome: Ongoing, Progressing  3/11/2024 2205 by Nica Nixon RN  Outcome: Ongoing, Progressing     Problem: Glycemic Control Impaired (Sepsis/Septic Shock)  Goal: Blood Glucose Level Within Desired Range  3/11/2024 2207 by Nica Nixon RN  Outcome: Ongoing, Progressing  3/11/2024 2205 by Nica Nixon RN  Outcome: Ongoing, Progressing     Problem: Infection Progression (Sepsis/Septic Shock)  Goal: Absence of Infection Signs and Symptoms  3/11/2024 2207 by Nica Nixon RN  Outcome: Ongoing, Progressing  3/11/2024 2205 by Nica Nixon RN  Outcome: Ongoing, Progressing     Problem: Nutrition Impaired (Sepsis/Septic Shock)  Goal: Optimal Nutrition Intake  3/11/2024 2207 by Nica Nixon RN  Outcome: Ongoing, Progressing  3/11/2024 2205 by Nica Nixon RN  Outcome: Ongoing, Progressing

## 2024-03-12 NOTE — PLAN OF CARE
Problem: Adult Inpatient Plan of Care  Goal: Plan of Care Review  Outcome: Ongoing, Progressing  Goal: Patient-Specific Goal (Individualized)  Outcome: Ongoing, Progressing  Goal: Absence of Hospital-Acquired Illness or Injury  Outcome: Ongoing, Progressing  Goal: Optimal Comfort and Wellbeing  Outcome: Ongoing, Progressing  Goal: Readiness for Transition of Care  Outcome: Ongoing, Progressing     Problem: Diabetes Comorbidity  Goal: Blood Glucose Level Within Targeted Range  Outcome: Ongoing, Progressing     Problem: Adjustment to Illness (Sepsis/Septic Shock)  Goal: Optimal Coping  Outcome: Ongoing, Progressing     Problem: Bleeding (Sepsis/Septic Shock)  Goal: Absence of Bleeding  Outcome: Ongoing, Progressing     Problem: Glycemic Control Impaired (Sepsis/Septic Shock)  Goal: Blood Glucose Level Within Desired Range  Outcome: Ongoing, Progressing     Problem: Infection Progression (Sepsis/Septic Shock)  Goal: Absence of Infection Signs and Symptoms  Outcome: Ongoing, Progressing     Problem: Nutrition Impaired (Sepsis/Septic Shock)  Goal: Optimal Nutrition Intake  Outcome: Ongoing, Progressing     Problem: Fall Injury Risk  Goal: Absence of Fall and Fall-Related Injury  Outcome: Ongoing, Progressing        See Scanned Documents.

## 2024-03-12 NOTE — PLAN OF CARE
03/12/24 0846   Discharge Assessment   Assessment Type Discharge Planning Assessment   Confirmed/corrected address, phone number and insurance Yes   Confirmed Demographics Correct on Facesheet   Source of Information patient   When was your last doctors appointment? 02/23/24   Reason For Admission Acute Colitis   People in Home spouse;child(venu), dependent   Facility Arrived From: Home   Do you expect to return to your current living situation? Yes   Prior to hospitilization cognitive status: Alert/Oriented   Current cognitive status: Alert/Oriented   Walking or Climbing Stairs Difficulty no   Dressing/Bathing Difficulty no   Equipment Currently Used at Home glucometer   Readmission within 30 days? No   Patient currently being followed by outpatient case management? No   Do you currently have service(s) that help you manage your care at home? No   Do you take prescription medications? Yes   Do you have prescription coverage? Yes   Coverage BCBS   Do you have any problems affording any of your prescribed medications? No   Is the patient taking medications as prescribed? yes   Who is going to help you get home at discharge? Wife   How do you get to doctors appointments? car, drives self   Are you on dialysis? No   Do you take coumadin? No   Discharge Plan A Home   DME Needed Upon Discharge  none   Discharge Plan discussed with: Patient   Transition of Care Barriers None   Physical Activity   On average, how many days per week do you engage in moderate to strenuous exercise (like a brisk walk)? 0 days   On average, how many minutes do you engage in exercise at this level? 0 min   Financial Resource Strain   How hard is it for you to pay for the very basics like food, housing, medical care, and heating? Not hard   Housing Stability   In the last 12 months, was there a time when you were not able to pay the mortgage or rent on time? N   In the last 12 months, how many places have you lived? 1   Transportation Needs    In the past 12 months, has lack of transportation kept you from medical appointments or from getting medications? no   In the past 12 months, has lack of transportation kept you from meetings, work, or from getting things needed for daily living? No   Food Insecurity   Within the past 12 months, you worried that your food would run out before you got the money to buy more. Never true   Within the past 12 months, the food you bought just didn't last and you didn't have money to get more. Never true   Stress   Do you feel stress - tense, restless, nervous, or anxious, or unable to sleep at night because your mind is troubled all the time - these days? Not at all   Social Connections   In a typical week, how many times do you talk on the phone with family, friends, or neighbors? More than 3   How often do you get together with friends or relatives? Once   How often do you attend Cheondoism or Christianity services? Never   Do you belong to any clubs or organizations such as Cheondoism groups, unions, fraternal or athletic groups, or school groups? No   How often do you attend meetings of the clubs or organizations you belong to? Never   Are you , , , , never , or living with a partner?    Alcohol Use   Q1: How often do you have a drink containing alcohol? Monthly or l   Q2: How many drinks containing alcohol do you have on a typical day when you are drinking? 1 or 2   Q3: How often do you have six or more drinks on one occasion? Never   OTHER   Name(s) of People in Home Griselda Rosa

## 2024-03-13 VITALS
RESPIRATION RATE: 20 BRPM | OXYGEN SATURATION: 97 % | HEIGHT: 70 IN | HEART RATE: 81 BPM | DIASTOLIC BLOOD PRESSURE: 73 MMHG | SYSTOLIC BLOOD PRESSURE: 132 MMHG | TEMPERATURE: 98 F | BODY MASS INDEX: 29.37 KG/M2 | WEIGHT: 205.19 LBS

## 2024-03-13 LAB
ADV 40+41 DNA STL QL NAA+NON-PROBE: NOT DETECTED
ALBUMIN SERPL-MCNC: 3.4 G/DL (ref 3.4–5)
ALBUMIN/GLOB SERPL: 1.4 RATIO
ALP SERPL-CCNC: 64 UNIT/L (ref 50–144)
ALT SERPL-CCNC: 16 UNIT/L (ref 1–45)
ANION GAP SERPL CALC-SCNC: 7 MEQ/L (ref 2–13)
AST SERPL-CCNC: 16 UNIT/L (ref 17–59)
ASTRO TYP 1-8 RNA STL QL NAA+NON-PROBE: NOT DETECTED
BASOPHILS # BLD AUTO: 0.04 X10(3)/MCL (ref 0.01–0.08)
BASOPHILS NFR BLD AUTO: 0.6 % (ref 0.1–1.2)
BILIRUB SERPL-MCNC: 0.6 MG/DL (ref 0–1)
BUN SERPL-MCNC: 17 MG/DL (ref 7–20)
C CAYETANENSIS DNA STL QL NAA+NON-PROBE: NOT DETECTED
C COLI+JEJ+UPSA DNA STL QL NAA+NON-PROBE: NOT DETECTED
C DIFF TOX GENS STL QL NAA+PROBE: NOT DETECTED
CALCIUM SERPL-MCNC: 9.4 MG/DL (ref 8.4–10.2)
CHLORIDE SERPL-SCNC: 108 MMOL/L (ref 98–110)
CO2 SERPL-SCNC: 21 MMOL/L (ref 21–32)
CONSISTENCY STL: NORMAL
CREAT SERPL-MCNC: 0.82 MG/DL (ref 0.66–1.25)
CREAT/UREA NIT SERPL: 21 (ref 12–20)
CRYPTOSP DNA STL QL NAA+NON-PROBE: NOT DETECTED
E COLI O157 DNA STL QL NAA+NON-PROBE: NORMAL
E HISTOLYT DNA STL QL NAA+NON-PROBE: NOT DETECTED
EAEC PAA PLAS AGGR+AATA ST NAA+NON-PRB: NOT DETECTED
EC STX1+STX2 GENES STL QL NAA+NON-PROBE: NOT DETECTED
EOSINOPHIL # BLD AUTO: 0.32 X10(3)/MCL (ref 0.04–0.54)
EOSINOPHIL NFR BLD AUTO: 4.6 % (ref 0.7–7)
EPEC EAE GENE STL QL NAA+NON-PROBE: NOT DETECTED
ERYTHROCYTE [DISTWIDTH] IN BLOOD BY AUTOMATED COUNT: 12.5 %
ETEC LTA+ST1A+ST1B TOX ST NAA+NON-PROBE: NOT DETECTED
FECAL LEUKOCYTE (OHS): POSITIVE
G LAMBLIA DNA STL QL NAA+NON-PROBE: NOT DETECTED
GFR SERPLBLD CREATININE-BSD FMLA CKD-EPI: >90 MLS/MIN/1.73/M2
GLOBULIN SER-MCNC: 2.4 GM/DL (ref 2–3.9)
GLUCOSE SERPL-MCNC: 124 MG/DL (ref 70–115)
HCT VFR BLD AUTO: 43.2 % (ref 36–52)
HGB BLD-MCNC: 14.7 G/DL (ref 13–18)
IMM GRANULOCYTES # BLD AUTO: 0.02 X10(3)/MCL (ref 0–0.03)
IMM GRANULOCYTES NFR BLD AUTO: 0.3 % (ref 0–0.5)
LYMPHOCYTES # BLD AUTO: 1.43 X10(3)/MCL (ref 1.32–3.57)
LYMPHOCYTES NFR BLD AUTO: 20.4 % (ref 20–55)
MCH RBC QN AUTO: 29.5 PG (ref 27–34)
MCHC RBC AUTO-ENTMCNC: 34 G/DL (ref 31–37)
MCV RBC AUTO: 86.6 FL (ref 79–99)
MONOCYTES # BLD AUTO: 0.72 X10(3)/MCL (ref 0.3–0.82)
MONOCYTES NFR BLD AUTO: 10.3 % (ref 4.7–12.5)
NEUTROPHILS # BLD AUTO: 4.48 X10(3)/MCL (ref 1.78–5.38)
NEUTROPHILS NFR BLD AUTO: 63.8 % (ref 37–73)
NOROVIRUS GI+II RNA STL QL NAA+NON-PROBE: NOT DETECTED
NRBC BLD AUTO-RTO: 0 %
P SHIGELLOIDES DNA STL QL NAA+NON-PROBE: NOT DETECTED
PLATELET # BLD AUTO: 194 X10(3)/MCL (ref 140–371)
PMV BLD AUTO: 9.7 FL (ref 9.4–12.4)
POTASSIUM SERPL-SCNC: 3.9 MMOL/L (ref 3.5–5.1)
PROT SERPL-MCNC: 5.8 GM/DL (ref 6.3–8.2)
RBC # BLD AUTO: 4.99 X10(6)/MCL (ref 4–6)
RVA RNA STL QL NAA+NON-PROBE: NOT DETECTED
S ENT+BONG DNA STL QL NAA+NON-PROBE: NOT DETECTED
SAPO I+II+IV+V RNA STL QL NAA+NON-PROBE: NOT DETECTED
SHIGELLA SP+EIEC IPAH ST NAA+NON-PROBE: NOT DETECTED
SODIUM SERPL-SCNC: 136 MMOL/L (ref 135–145)
V CHOL+PARA+VUL DNA STL QL NAA+NON-PROBE: NOT DETECTED
V CHOLERAE DNA STL QL NAA+NON-PROBE: NOT DETECTED
WBC # SPEC AUTO: 7.01 X10(3)/MCL (ref 4–11.5)
Y ENTEROCOL DNA STL QL NAA+NON-PROBE: NOT DETECTED

## 2024-03-13 PROCEDURE — 25000003 PHARM REV CODE 250

## 2024-03-13 PROCEDURE — 63600175 PHARM REV CODE 636 W HCPCS: Performed by: INTERNAL MEDICINE

## 2024-03-13 PROCEDURE — 89055 LEUKOCYTE ASSESSMENT FECAL: CPT | Performed by: INTERNAL MEDICINE

## 2024-03-13 PROCEDURE — 87077 CULTURE AEROBIC IDENTIFY: CPT | Performed by: INTERNAL MEDICINE

## 2024-03-13 PROCEDURE — 63600175 PHARM REV CODE 636 W HCPCS: Mod: JZ,JG

## 2024-03-13 PROCEDURE — 80053 COMPREHEN METABOLIC PANEL: CPT | Performed by: FAMILY MEDICINE

## 2024-03-13 PROCEDURE — 36415 COLL VENOUS BLD VENIPUNCTURE: CPT | Performed by: FAMILY MEDICINE

## 2024-03-13 PROCEDURE — 94761 N-INVAS EAR/PLS OXIMETRY MLT: CPT

## 2024-03-13 PROCEDURE — 87493 C DIFF AMPLIFIED PROBE: CPT | Performed by: INTERNAL MEDICINE

## 2024-03-13 PROCEDURE — 85025 COMPLETE CBC W/AUTO DIFF WBC: CPT | Performed by: FAMILY MEDICINE

## 2024-03-13 PROCEDURE — 87507 IADNA-DNA/RNA PROBE TQ 12-25: CPT | Performed by: INTERNAL MEDICINE

## 2024-03-13 PROCEDURE — 25000003 PHARM REV CODE 250: Performed by: INTERNAL MEDICINE

## 2024-03-13 RX ORDER — METRONIDAZOLE 500 MG/1
500 TABLET ORAL EVERY 8 HOURS
Qty: 21 TABLET | Refills: 0 | Status: SHIPPED | OUTPATIENT
Start: 2024-03-13 | End: 2024-03-20

## 2024-03-13 RX ORDER — CIPROFLOXACIN 500 MG/1
500 TABLET ORAL EVERY 12 HOURS
Qty: 10 TABLET | Refills: 0 | Status: SHIPPED | OUTPATIENT
Start: 2024-03-13 | End: 2024-03-18

## 2024-03-13 RX ADMIN — LISINOPRIL 10 MG: 10 TABLET ORAL at 08:03

## 2024-03-13 RX ADMIN — FLUOXETINE 20 MG: 20 CAPSULE ORAL at 08:03

## 2024-03-13 RX ADMIN — ATORVASTATIN CALCIUM 40 MG: 40 TABLET, FILM COATED ORAL at 08:03

## 2024-03-13 RX ADMIN — CIPROFLOXACIN 400 MG: 2 INJECTION, SOLUTION INTRAVENOUS at 08:03

## 2024-03-13 RX ADMIN — METRONIDAZOLE 500 MG: 5 INJECTION, SOLUTION INTRAVENOUS at 09:03

## 2024-03-13 RX ADMIN — METRONIDAZOLE 500 MG: 5 INJECTION, SOLUTION INTRAVENOUS at 02:03

## 2024-03-13 RX ADMIN — TAMSULOSIN HYDROCHLORIDE 0.4 MG: 0.4 CAPSULE ORAL at 08:03

## 2024-03-13 NOTE — PLAN OF CARE
Problem: Adult Inpatient Plan of Care  Goal: Plan of Care Review  Outcome: Ongoing, Progressing  Flowsheets (Taken 3/13/2024 0949)  Plan of Care Reviewed With:   patient   spouse  Goal: Patient-Specific Goal (Individualized)  Outcome: Ongoing, Progressing  Goal: Absence of Hospital-Acquired Illness or Injury  Outcome: Ongoing, Progressing  Intervention: Identify and Manage Fall Risk  Flowsheets (Taken 3/13/2024 0949)  Safety Promotion/Fall Prevention:   assistive device/personal item within reach   bed alarm refused   commode/urinal/bedpan at bedside   high risk medications identified   medications reviewed   nonskid shoes/socks when out of bed   /camera at bedside   supervised activity   instructed to call staff for mobility  Intervention: Prevent Skin Injury  Flowsheets (Taken 3/13/2024 0949)  Body Position: position changed independently  Skin Protection:   adhesive use limited   tubing/devices free from skin contact  Intervention: Prevent and Manage VTE (Venous Thromboembolism) Risk  Flowsheets (Taken 3/13/2024 0949)  Activity Management: Ambulated to bathroom - L4  VTE Prevention/Management: ROM (active) performed  Range of Motion: active ROM (range of motion) encouraged  Intervention: Prevent Infection  Flowsheets (Taken 3/13/2024 0949)  Infection Prevention:   environmental surveillance performed   hand hygiene promoted   personal protective equipment utilized   rest/sleep promoted  Goal: Optimal Comfort and Wellbeing  Outcome: Ongoing, Progressing  Intervention: Provide Person-Centered Care  Flowsheets (Taken 3/13/2024 0949)  Trust Relationship/Rapport:   care explained   choices provided   emotional support provided   empathic listening provided   questions answered   questions encouraged   thoughts/feelings acknowledged   reassurance provided  Goal: Readiness for Transition of Care  Outcome: Ongoing, Progressing     Problem: Diabetes Comorbidity  Goal: Blood Glucose Level Within Targeted  Range  Outcome: Ongoing, Progressing  Intervention: Monitor and Manage Glycemia  Flowsheets (Taken 3/13/2024 0949)  Glycemic Management: blood glucose monitored     Problem: Adjustment to Illness (Sepsis/Septic Shock)  Goal: Optimal Coping  Outcome: Ongoing, Progressing  Intervention: Optimize Psychosocial Adjustment to Illness  Flowsheets (Taken 3/13/2024 0949)  Supportive Measures:   active listening utilized   decision-making supported     Problem: Bleeding (Sepsis/Septic Shock)  Goal: Absence of Bleeding  Outcome: Ongoing, Progressing     Problem: Glycemic Control Impaired (Sepsis/Septic Shock)  Goal: Blood Glucose Level Within Desired Range  Outcome: Ongoing, Progressing  Intervention: Optimize Glycemic Control  Flowsheets (Taken 3/13/2024 0949)  Glycemic Management: blood glucose monitored     Problem: Infection Progression (Sepsis/Septic Shock)  Goal: Absence of Infection Signs and Symptoms  Outcome: Ongoing, Progressing  Intervention: Initiate Sepsis Management  Flowsheets (Taken 3/13/2024 0949)  Infection Prevention:   environmental surveillance performed   hand hygiene promoted   personal protective equipment utilized   rest/sleep promoted  Intervention: Promote Recovery  Flowsheets (Taken 3/13/2024 0949)  Activity Management: Ambulated to bathroom - L4     Problem: Nutrition Impaired (Sepsis/Septic Shock)  Goal: Optimal Nutrition Intake  Outcome: Ongoing, Progressing     Problem: Fall Injury Risk  Goal: Absence of Fall and Fall-Related Injury  Outcome: Ongoing, Progressing  Intervention: Identify and Manage Contributors  Flowsheets (Taken 3/13/2024 0949)  Self-Care Promotion: independence encouraged  Medication Review/Management: medications reviewed

## 2024-03-13 NOTE — ASSESSMENT & PLAN NOTE
Continue ciprofloxacin 400 mg IV every 12 hours, metronidazole 500 mg IV every 8 hours, IV fluids, and as needed antiemetics.   Stool studies will be sent on admission and he will be started on a clear liquid diet which can be advanced as tolerated.   Advance diet this am, continue iv abx   May d/c if continues to improve in am

## 2024-03-13 NOTE — PLAN OF CARE
Problem: Adult Inpatient Plan of Care  Goal: Plan of Care Review  Outcome: Ongoing, Progressing  Goal: Patient-Specific Goal (Individualized)  Outcome: Ongoing, Progressing  Goal: Absence of Hospital-Acquired Illness or Injury  Outcome: Ongoing, Progressing  Goal: Optimal Comfort and Wellbeing  Outcome: Ongoing, Progressing  Goal: Readiness for Transition of Care  Outcome: Ongoing, Progressing     Problem: Diabetes Comorbidity  Goal: Blood Glucose Level Within Targeted Range  Outcome: Ongoing, Progressing     Problem: Adjustment to Illness (Sepsis/Septic Shock)  Goal: Optimal Coping  Outcome: Ongoing, Progressing     Problem: Glycemic Control Impaired (Sepsis/Septic Shock)  Goal: Blood Glucose Level Within Desired Range  Outcome: Ongoing, Progressing     Problem: Fall Injury Risk  Goal: Absence of Fall and Fall-Related Injury  Outcome: Ongoing, Progressing

## 2024-03-13 NOTE — NURSING
Provided patient with discharge education focusing on activity, diet, worsening signs/symptoms, taking medications, and keeping follow-up appointment. Patient verbalized understanding and denies further questions at this time. Patient discharged from the floor via wheelchair accompanied by transport personnel. Patient in stable condition and denies further needs at this time.

## 2024-03-13 NOTE — HOSPITAL COURSE
03/12/2024 pt feeling better, still with tenderness on RLQ, nausea and vomtiing have resolved.  03/13/2024 DISCHARGE SUMMARY: pt admitted with collitis, improving on po abx and still with some loose stool, not watery, tolerating diet.  PCR negative for c. Diff.  GI PCR complete is still pending, ander d/c home with po cipro and flagyl, f/u with PCP 1 week

## 2024-03-13 NOTE — DISCHARGE SUMMARY
[facility] Medicine  Discharge Summary      Patient Name: Juanita Rosa  MRN: [id number]  [facility]: [id number]  Patient Class: IP- Inpatient  Admission Date: 3/11/2024  Hospital Length of Stay: 2 days  Discharge Date and Time:  03/13/2024 2:44 PM  Attending Physician: Abiodun Cleary MD   Discharging Provider: Elizabet Tomas MD  Primary Care Provider: Tony Monae MD    Primary Care Team: Networked reference to record PCT     HPI:   Mr. Rosa is a 53 year old  male with a history of type II diabetes mellitus, HTN, hyperlipidemia, and BPH who presented to the ER today with a 2 day history of diarrhea. He was in his normal state of health until Saaturday 3/10/2024 when he developed nausea, vomiting x 2, belching, diarrhea with >20 loose stools daily, and decreased appetite. He had abdominal bloating yesterday but he denies any fevers, chills, blood in the stool, chest pain, shortness of breath, or cough. He denies any history of Clostridium difficile colitis or recent antibiotic use. He is scheduled for elective colonoscopy on Friday 3/15/2024. He became lethargic and weak today which is what prompted him to come to [facility] for further evaluation. On arrival to the ER he was tachycardic with a heart rate of 116 and hypertensive with a blood pressure of 156/77 and his initial lab work was for a WBC count of 20.41, CO2 of 14, BUN 28, creatinine of 1.23, magnesium of 1.7, and a glucose of 132.  CT of the abdomen pelvis showed colonic diverticulosis with mild colonic wall thickening and mild hyperemia of small bowel loops.   He has received zosyn 4.5 g IV, metronidazole 500 mg IV, ondansetron 4 mg IV, magnesium sulfate 2 g IV, and 3859 liters of IV fluids in the ER.  He is otherwise in stable condition and he has no other complaints today.    * No surgery found *      Hospital Course:   03/12/2024 pt feeling better, still with tenderness on RLQ, nausea  and vomtiing have resolved.  03/13/2024 DISCHARGE SUMMARY: pt admitted with collitis, improving on po abx and still with some loose stool, not watery, tolerating diet.  PCR negative for c. Diff.  GI PCR complete is still pending, ander d/c home with po cipro and flagyl, f/u with PCP 1 week     Goals of Care Treatment Preferences:  Code Status: Full Code      Consults:     No new Assessment & Plan notes have been filed under this hospital service since the last note was generated.  Service: Hospital Medicine    Final Active Diagnoses:    Diagnosis Date Noted POA    PRINCIPAL PROBLEM:  Acute colitis [K52.9] 03/11/2024 Yes    Sepsis [A41.9] 03/11/2024 Yes    Hypomagnesemia [E83.42] 03/11/2024 Yes    Depression [F32.A] 03/11/2024 Yes    Metabolic acidosis [E87.20] 03/11/2024 Yes    Hypertension [I10] 05/10/2023 Yes    Hyperlipidemia [E78.5] 05/10/2023 Yes    Type II diabetes mellitus [E11.9] 05/10/2023 Yes    BPH (benign prostatic hyperplasia) [N40.0] 05/10/2023 Yes      Problems Resolved During this Admission:       Discharged Condition: good    Disposition: Home or Self Care    Follow Up:   Follow-up Information       Tony Monae MD Follow up.    Specialty: Family Medicine  Contact information:  [address] [place] [address]  Gautam SHAZIA [place] [id number] [phone]                           Patient Instructions:      Activity as tolerated       Significant Diagnostic Studies: Labs: CMP   Recent Labs   Lab 03/11/24  1623 03/12/24  0416 03/13/24 0417    135 136   K 5.0 4.5 3.9   CO2 14* 17* 21   BUN 28.0* 20.0 17.0   CREATININE 1.23 0.89 0.82   CALCIUM 10.0 8.9 9.4   ALBUMIN 5.0 3.4 3.4   BILITOT 1.3* 1.3* 0.6   ALKPHOS 79 66 64   AST 21 12* 16*   ALT 26 17 16    and CBC   Recent Labs   Lab 03/11/24  1623 03/12/24  0416 03/13/24 0417   WBC 20.41* 10.87 7.01   HGB 18.6* 15.2 14.7   HCT 54.6* 44.7 43.2    241 194       Pending Diagnostic Studies:       Procedure Component Value Units Date/Time    FECAL LEUKOCYTES -  "LACTOFERRIN ON  STOOL [[id number]] Collected: 03/13/24 1123    Order Status: Sent Lab Status: In process Updated: 03/13/24 1138    Specimen: Stool     GI Panel [[id number]] Collected: 03/13/24 1123    Order Status: Sent Lab Status: In process Updated: 03/13/24 1137    Specimen: Stool     Gastrointestinal Pathogens Panel, PCR [[id number]]     Order Status: Sent Lab Status: No result     Specimen: Stool            Medications:  Reconciled Home Medications:      Medication List        START taking these medications      ciprofloxacin HCl 500 MG tablet  Commonly known as: CIPRO  Take 1 tablet (500 mg total) by mouth every 12 (twelve) hours. for 5 days     metroNIDAZOLE 500 MG tablet  Commonly known as: FLAGYL  Take 1 tablet (500 mg total) by mouth every 8 (eight) hours. for 7 days            CONTINUE taking these medications      FLUoxetine 20 MG capsule  Take 1 capsule (20 mg total) by mouth once daily.     insulin degludec 200 unit/mL (3 mL) insulin pen  Commonly known as: TRESIBA FLEXTOUCH U-200  Inject 50 Units into the skin once daily.     JARDIANCE 25 mg tablet  Generic drug: empagliflozin  Take 1 tablet (25 mg total) by mouth once daily.     lisinopriL 10 MG tablet  TAKE 1 TABLET DAILY     metFORMIN 1000 MG tablet  Commonly known as: GLUCOPHAGE  TAKE 1 TABLET TWICE A DAY     pen needle, diabetic 31 gauge x 3/16" Ndle  Commonly known as: SURE COMFORT PEN NEEDLE  USE ONCE DAILY WITH INSULIN     phentermine 37.5 mg tablet  Commonly known as: ADIPEX-P  Take 1 tablet (37.5 mg total) by mouth once daily.     rosuvastatin 10 MG tablet  Commonly known as: CRESTOR  TAKE 1 TABLET ONCE DAILY     tadalafiL 5 MG tablet  Commonly known as: CIALIS  Take 4 tablets (20 mg total) by mouth once daily.     tamsulosin 0.4 mg Cap  Commonly known as: FLOMAX  Take 1 capsule (0.4 mg total) by mouth once daily.     tirzepatide 7.5 mg/0.5 mL Pnij  Inject 7.5 mg into the skin every 7 days.              Indwelling Lines/Drains at time of " discharge:   Lines/Drains/Airways       None                   Time spent on the discharge of patient: 37 minutes     Physical Exam  Vitals and nursing note reviewed.   Constitutional:       General: He is not in acute distress.     Appearance: Normal appearance. He is normal weight. He is not ill-appearing.   HENT:      Head: Normocephalic and atraumatic.   Cardiovascular:      Rate and Rhythm: Normal rate and regular rhythm.      Pulses: Normal pulses.      Heart sounds: Normal heart sounds.   Pulmonary:      Effort: Pulmonary effort is normal.      Breath sounds: Normal breath sounds.   Abdominal:      General: Abdomen is flat. Bowel sounds are normal.      Palpations: Abdomen is soft.   Skin:     General: Skin is warm and dry.      Findings: No erythema or rash.   Neurological:      Mental Status: He is alert.   Psychiatric:      Comments: I had a face to face encounter with this patient prior to discharging           Elizabet Tomas MD  Department of Hospital Medicine  [facility]

## 2024-03-13 NOTE — PLAN OF CARE
03/13/24 1446   Final Note   Assessment Type Final Discharge Note   Anticipated Discharge Disposition Home   What phone number can be called within the next 1-3 days to see how you are doing after discharge? [phone]   Post-Acute Status   Coverage BCBS   Discharge Delays None known at this time

## 2024-03-13 NOTE — SUBJECTIVE & OBJECTIVE
Interval History:      Review of Systems   Constitutional:  Negative for appetite change, fatigue and fever.   Respiratory:  Negative for cough, shortness of breath and wheezing.    Cardiovascular:  Negative for chest pain and leg swelling.   Gastrointestinal:  Positive for abdominal pain, nausea and vomiting. Negative for abdominal distention, constipation and diarrhea.   Skin:  Negative for color change, pallor, rash and wound.   Neurological:  Negative for tremors, syncope and headaches.   Psychiatric/Behavioral:  Negative for agitation and behavioral problems.      Objective:     Vital Signs (Most Recent):  Temp: 97.6 °F (36.4 °C) (03/13/24 0701)  Pulse: 80 (03/13/24 0730)  Resp: 20 (03/13/24 0701)  BP: (!) 142/81 (03/13/24 0701)  SpO2: 95 % (03/13/24 0730) Vital Signs (24h Range):  Temp:  [97.6 °F (36.4 °C)-98.5 °F (36.9 °C)] 97.6 °F (36.4 °C)  Pulse:  [72-90] 80  Resp:  [18-20] 20  SpO2:  [18 %-97 %] 95 %  BP: ()/(48-81) 142/81     Weight: 93.1 kg (205 lb 3.2 oz)  Body mass index is 29.44 kg/m².    Intake/Output Summary (Last 24 hours) at 3/13/2024 0921  Last data filed at 3/13/2024 0605  Gross per 24 hour   Intake 1640 ml   Output --   Net 1640 ml         Physical Exam  Vitals and nursing note reviewed. Exam conducted with a chaperone present.   Constitutional:       General: He is not in acute distress.     Appearance: Normal appearance. He is normal weight. He is not ill-appearing.   HENT:      Head: Normocephalic and atraumatic.      Nose: Nose normal.   Eyes:      General: No scleral icterus.     Conjunctiva/sclera: Conjunctivae normal.   Cardiovascular:      Rate and Rhythm: Normal rate and regular rhythm.      Pulses: Normal pulses.      Heart sounds: Normal heart sounds.   Pulmonary:      Effort: Pulmonary effort is normal.      Breath sounds: Normal breath sounds.   Abdominal:      General: Abdomen is flat. Bowel sounds are normal.      Palpations: Abdomen is soft.   Skin:     General: Skin is  warm and dry.      Findings: No erythema or rash.   Neurological:      General: No focal deficit present.      Mental Status: He is alert and oriented to person, place, and time.   Psychiatric:         Mood and Affect: Mood normal.         Behavior: Behavior normal.         Thought Content: Thought content normal.             Significant Labs: All pertinent labs within the past 24 hours have been reviewed.  CBC:   Recent Labs   Lab 03/11/24  1623 03/12/24  0416 03/13/24  0417   WBC 20.41* 10.87 7.01   HGB 18.6* 15.2 14.7   HCT 54.6* 44.7 43.2    241 194     CMP:   Recent Labs   Lab 03/11/24  1623 03/12/24  0416 03/13/24  0417    135 136   K 5.0 4.5 3.9   CO2 14* 17* 21   BUN 28.0* 20.0 17.0   CREATININE 1.23 0.89 0.82   CALCIUM 10.0 8.9 9.4   ALBUMIN 5.0 3.4 3.4   BILITOT 1.3* 1.3* 0.6   ALKPHOS 79 66 64   AST 21 12* 16*   ALT 26 17 16       Significant Imaging: I have reviewed all pertinent imaging results/findings within the past 24 hours.

## 2024-03-14 ENCOUNTER — PATIENT OUTREACH (OUTPATIENT)
Dept: ADMINISTRATIVE | Facility: CLINIC | Age: 54
End: 2024-03-14
Payer: COMMERCIAL

## 2024-03-14 NOTE — PROGRESS NOTES
C3 nurse spoke with Juanita Rosa for a TCC post hospital discharge follow up call. The patient has a scheduled HOSFU appointment with Tony Monae MD on 3/20/24 @ 11:15.

## 2024-03-16 LAB
BACTERIA BLD CULT: NORMAL
BACTERIA BLD CULT: NORMAL
BACTERIA STL CULT: NORMAL

## 2024-03-18 ENCOUNTER — OFFICE VISIT (OUTPATIENT)
Dept: FAMILY MEDICINE | Facility: CLINIC | Age: 54
End: 2024-03-18
Payer: COMMERCIAL

## 2024-03-18 VITALS
HEART RATE: 82 BPM | WEIGHT: 210 LBS | HEIGHT: 70 IN | OXYGEN SATURATION: 98 % | TEMPERATURE: 98 F | SYSTOLIC BLOOD PRESSURE: 138 MMHG | BODY MASS INDEX: 30.06 KG/M2 | DIASTOLIC BLOOD PRESSURE: 68 MMHG

## 2024-03-18 DIAGNOSIS — K57.92 DIVERTICULITIS: Primary | ICD-10-CM

## 2024-03-18 DIAGNOSIS — Z79.4 TYPE 2 DIABETES MELLITUS WITHOUT COMPLICATION, WITH LONG-TERM CURRENT USE OF INSULIN: ICD-10-CM

## 2024-03-18 DIAGNOSIS — E11.9 TYPE 2 DIABETES MELLITUS WITHOUT COMPLICATION, WITH LONG-TERM CURRENT USE OF INSULIN: ICD-10-CM

## 2024-03-18 PROCEDURE — 1159F MED LIST DOCD IN RCRD: CPT | Mod: CPTII,,, | Performed by: FAMILY MEDICINE

## 2024-03-18 PROCEDURE — 99213 OFFICE O/P EST LOW 20 MIN: CPT | Mod: ,,, | Performed by: FAMILY MEDICINE

## 2024-03-18 PROCEDURE — 3008F BODY MASS INDEX DOCD: CPT | Mod: CPTII,,, | Performed by: FAMILY MEDICINE

## 2024-03-18 PROCEDURE — 3078F DIAST BP <80 MM HG: CPT | Mod: CPTII,,, | Performed by: FAMILY MEDICINE

## 2024-03-18 PROCEDURE — 4010F ACE/ARB THERAPY RXD/TAKEN: CPT | Mod: CPTII,,, | Performed by: FAMILY MEDICINE

## 2024-03-18 PROCEDURE — 3075F SYST BP GE 130 - 139MM HG: CPT | Mod: CPTII,,, | Performed by: FAMILY MEDICINE

## 2024-03-18 NOTE — PROGRESS NOTES
SUBJECTIVE:  Juanita Rosa is a 53 y.o. male here for Follow-up (Hospital f/u)      HPI  Patient here for hospital follow up.  He was hospitalized a week ago for diverticulitis.  At the time he was fairly ill with severe vomiting and diarrhea for 48 hours.  He went to the hospital with a 20,000 white count serum bicarb of 14 but he is on Jardiance.  He never had abdominal pain.  Bruces allergies, medications, history, and problem list were updated as appropriate.    Review of Systems   He is feeling much better now with no GI symptoms.  He is still has some fatigue.    Recent Results (from the past 504 hour(s))   POCT glucose    Collection Time: 03/11/24  3:40 PM   Result Value Ref Range    POCT Glucose 116 (H) 70 - 110 mg/dL   Comprehensive metabolic panel    Collection Time: 03/11/24  4:23 PM   Result Value Ref Range    Sodium Level 139 135 - 145 mmol/L    Potassium Level 5.0 3.5 - 5.1 mmol/L    Chloride 107 98 - 110 mmol/L    Carbon Dioxide 14 (L) 21 - 32 mmol/L    Glucose Level 132 (H) 70 - 115 mg/dL    Blood Urea Nitrogen 28.0 (H) 7.0 - 20.0 mg/dL    Creatinine 1.23 0.66 - 1.25 mg/dL    Calcium Level Total 10.0 8.4 - 10.2 mg/dL    Protein Total 8.3 (H) 6.3 - 8.2 gm/dL    Albumin Level 5.0 3.4 - 5.0 g/dL    Globulin 3.3 2.0 - 3.9 gm/dL    Albumin/Globulin Ratio 1.5 ratio    Bilirubin Total 1.3 (H) 0.0 - 1.0 mg/dL    Alkaline Phosphatase 79 50 - 144 unit/L    Alanine Aminotransferase 26 1 - 45 unit/L    Aspartate Aminotransferase 21 17 - 59 unit/L    eGFR 70 mls/min/1.73/m2    Anion Gap 18.0 (H) 2.0 - 13.0 mEq/L    BUN/Creatinine Ratio 23 (H) 12 - 20   Magnesium    Collection Time: 03/11/24  4:23 PM   Result Value Ref Range    Magnesium Level 1.70 (L) 1.80 - 2.40 mg/dL   Troponin I    Collection Time: 03/11/24  4:23 PM   Result Value Ref Range    Troponin-I <0.012 0.000 - 0.034 ng/mL   CBC with Differential    Collection Time: 03/11/24  4:23 PM   Result Value Ref Range    WBC 20.41 (H) 4.00 - 11.50 x10(3)/mcL     RBC 6.20 (H) 4.00 - 6.00 x10(6)/mcL    Hgb 18.6 (H) 13.0 - 18.0 g/dL    Hct 54.6 (H) 36.0 - 52.0 %    MCV 88.1 79.0 - 99.0 fL    MCH 30.0 27.0 - 34.0 pg    MCHC 34.1 31.0 - 37.0 g/dL    RDW 12.9 %    Platelet 363 140 - 371 x10(3)/mcL    MPV 9.5 9.4 - 12.4 fL    Neut % 80.2 (H) 37 - 73 %    Lymph % 10.4 (L) 20 - 55 %    Mono % 8.1 4.7 - 12.5 %    Eos % 0.5 (L) 0.7 - 7 %    Basophil % 0.3 0.1 - 1.2 %    Lymph # 2.12 1.32 - 3.57 x10(3)/mcL    Neut # 16.35 (H) 1.78 - 5.38 x10(3)/mcL    Mono # 1.66 (H) 0.3 - 0.82 x10(3)/mcL    Eos # 0.11 0.04 - 0.54 x10(3)/mcL    Baso # 0.06 0.01 - 0.08 x10(3)/mcL    IG# 0.11 (H) 0.0001 - 0.031 x10(3)/mcL    IG% 0.5 0 - 0.5 %    NRBC% 0.0 <=1 %   Blood Culture #1 **CANNOT BE ORDERED STAT**    Collection Time: 03/11/24  5:00 PM    Specimen: Blood, Peripheral Line   Result Value Ref Range    CULTURE, BLOOD ([facility]) No Growth at 5 days    Blood Culture #1 **CANNOT BE ORDERED STAT**    Collection Time: 03/11/24  5:07 PM    Specimen: Blood, Peripheral Line   Result Value Ref Range    CULTURE, BLOOD ([facility]) No Growth at 5 days    Lactic acid, plasma    Collection Time: 03/11/24  5:22 PM   Result Value Ref Range    Lactic Acid Level 1.0 0.4 - 2.0 mmol/L   Comprehensive metabolic panel (daily)    Collection Time: 03/12/24  4:16 AM   Result Value Ref Range    Sodium Level 135 135 - 145 mmol/L    Potassium Level 4.5 3.5 - 5.1 mmol/L    Chloride 109 98 - 110 mmol/L    Carbon Dioxide 17 (L) 21 - 32 mmol/L    Glucose Level 98 70 - 115 mg/dL    Blood Urea Nitrogen 20.0 7.0 - 20.0 mg/dL    Creatinine 0.89 0.66 - 1.25 mg/dL    Calcium Level Total 8.9 8.4 - 10.2 mg/dL    Protein Total 5.7 (L) 6.3 - 8.2 gm/dL    Albumin Level 3.4 3.4 - 5.0 g/dL    Globulin 2.3 2.0 - 3.9 gm/dL    Albumin/Globulin Ratio 1.5 ratio    Bilirubin Total 1.3 (H) 0.0 - 1.0 mg/dL    Alkaline Phosphatase 66 50 - 144 unit/L    Alanine Aminotransferase 17 1 - 45 unit/L    Aspartate Aminotransferase 12 (L) 17 - 59 unit/L    eGFR >90  mls/min/1.73/m2    Anion Gap 9.0 2.0 - 13.0 mEq/L    BUN/Creatinine Ratio 22 (H) 12 - 20   Magnesium - Daily    Collection Time: 03/12/24  4:16 AM   Result Value Ref Range    Magnesium Level 1.80 1.80 - 2.40 mg/dL   Phosphorus -Daily    Collection Time: 03/12/24  4:16 AM   Result Value Ref Range    Phosphorus Level 2.5 2.5 - 4.9 mg/dL   CBC with Differential    Collection Time: 03/12/24  4:16 AM   Result Value Ref Range    WBC 10.87 4.00 - 11.50 x10(3)/mcL    RBC 5.12 4.00 - 6.00 x10(6)/mcL    Hgb 15.2 13.0 - 18.0 g/dL    Hct 44.7 36.0 - 52.0 %    MCV 87.3 79.0 - 99.0 fL    MCH 29.7 27.0 - 34.0 pg    MCHC 34.0 31.0 - 37.0 g/dL    RDW 12.7 %    Platelet 241 140 - 371 x10(3)/mcL    MPV 9.6 9.4 - 12.4 fL    Neut % 68.4 37 - 73 %    Lymph % 16.5 (L) 20 - 55 %    Mono % 11.3 4.7 - 12.5 %    Eos % 3.1 0.7 - 7 %    Basophil % 0.3 0.1 - 1.2 %    Lymph # 1.79 1.32 - 3.57 x10(3)/mcL    Neut # 7.44 (H) 1.78 - 5.38 x10(3)/mcL    Mono # 1.23 (H) 0.3 - 0.82 x10(3)/mcL    Eos # 0.34 0.04 - 0.54 x10(3)/mcL    Baso # 0.03 0.01 - 0.08 x10(3)/mcL    IG# 0.04 (H) 0.0001 - 0.031 x10(3)/mcL    IG% 0.4 0 - 0.5 %    NRBC% 0.0 <=1 %   POCT glucose    Collection Time: 03/12/24  7:11 AM   Result Value Ref Range    POCT Glucose 95 70 - 110 mg/dL   POCT glucose    Collection Time: 03/12/24 11:06 AM   Result Value Ref Range    POCT Glucose 102 70 - 110 mg/dL   POCT glucose    Collection Time: 03/12/24  4:12 PM   Result Value Ref Range    POCT Glucose 100 70 - 110 mg/dL   POCT glucose    Collection Time: 03/12/24  7:40 PM   Result Value Ref Range    POCT Glucose 151 (H) 70 - 110 mg/dL   Comprehensive Metabolic Panel    Collection Time: 03/13/24  4:17 AM   Result Value Ref Range    Sodium Level 136 135 - 145 mmol/L    Potassium Level 3.9 3.5 - 5.1 mmol/L    Chloride 108 98 - 110 mmol/L    Carbon Dioxide 21 21 - 32 mmol/L    Glucose Level 124 (H) 70 - 115 mg/dL    Blood Urea Nitrogen 17.0 7.0 - 20.0 mg/dL    Creatinine 0.82 0.66 - 1.25 mg/dL     Calcium Level Total 9.4 8.4 - 10.2 mg/dL    Protein Total 5.8 (L) 6.3 - 8.2 gm/dL    Albumin Level 3.4 3.4 - 5.0 g/dL    Globulin 2.4 2.0 - 3.9 gm/dL    Albumin/Globulin Ratio 1.4 ratio    Bilirubin Total 0.6 0.0 - 1.0 mg/dL    Alkaline Phosphatase 64 50 - 144 unit/L    Alanine Aminotransferase 16 1 - 45 unit/L    Aspartate Aminotransferase 16 (L) 17 - 59 unit/L    eGFR >90 mls/min/1.73/m2    Anion Gap 7.0 2.0 - 13.0 mEq/L    BUN/Creatinine Ratio 21 (H) 12 - 20   CBC with Differential    Collection Time: 03/13/24  4:17 AM   Result Value Ref Range    WBC 7.01 4.00 - 11.50 x10(3)/mcL    RBC 4.99 4.00 - 6.00 x10(6)/mcL    Hgb 14.7 13.0 - 18.0 g/dL    Hct 43.2 36.0 - 52.0 %    MCV 86.6 79.0 - 99.0 fL    MCH 29.5 27.0 - 34.0 pg    MCHC 34.0 31.0 - 37.0 g/dL    RDW 12.5 %    Platelet 194 140 - 371 x10(3)/mcL    MPV 9.7 9.4 - 12.4 fL    Neut % 63.8 37 - 73 %    Lymph % 20.4 20 - 55 %    Mono % 10.3 4.7 - 12.5 %    Eos % 4.6 0.7 - 7 %    Basophil % 0.6 0.1 - 1.2 %    Lymph # 1.43 1.32 - 3.57 x10(3)/mcL    Neut # 4.48 1.78 - 5.38 x10(3)/mcL    Mono # 0.72 0.3 - 0.82 x10(3)/mcL    Eos # 0.32 0.04 - 0.54 x10(3)/mcL    Baso # 0.04 0.01 - 0.08 x10(3)/mcL    IG# 0.02 0.0001 - 0.031 x10(3)/mcL    IG% 0.3 0 - 0.5 %    NRBC% 0.0 <=1 %   GI Panel    Collection Time: 03/13/24 11:23 AM   Result Value Ref Range    CAMPYLOBACTER Not Detected Not Detected    PLESIOMONAS SHIGELLOIDES Not Detected Not Detected    SALMONELLA Not Detected Not Detected    Vibrio Not Detected Not Detected    YERSINIA ENTEROCOLITICA Not Detected Not Detected    ENTEROAGGREGATIVE E. COLI (EAEC) Not Detected Not Detected    ENTEROPATHOGENIC E. COLI (EPEC) Not Detected Not Detected    Enterotoxigenic E. coli (ETEC) Not Detected Not Detected    SHIGA-LIKE TOXIN-PRODUCING E. COLI (STEC) Not Detected Not Detected    E. COLI O157      Shigella/Enteroinvasive E. coli (EIEC) Not Detected Not Detected    CRYPTOSPORIDIUM Not Detected Not Detected    Cycolospora  "cayetanensis Not Detected Not Detected    Entamoeba histolytica Not Detected Not Detected    Giardia lamblia Not Detected Not Detected    Adenovirus F 40/41 Not Detected Not Detected    Astrovirus Not Detected Not Detected    Norovirus GI/GII Not Detected Not Detected    Rotavirus A Not Detected Not Detected    Sapovirus Not Detected Not Detected    VIBRIO CHOLERAE Not Detected Not Detected    Stool Consistancy liquid    C Diff Toxin by PCR    Collection Time: 03/13/24 11:23 AM    Specimen: Stool   Result Value Ref Range    Clostridium difficile toxin PCR Not Detected Not Detected   Stool Culture    Collection Time: 03/13/24 11:23 AM    Specimen: Stool   Result Value Ref Range    Stool Culture       Negative for Salmonella, Shigella, Campylobacter, Vibrio, Aeromonas, Pleisiomonas,Yersinia, or Shiga Toxin 1 and 2.   FECAL LEUKOCYTES - LACTOFERRIN ON  STOOL    Collection Time: 03/13/24 11:23 AM   Result Value Ref Range    Fecal Leukocyte Positive (A) Negative       OBJECTIVE:  Vital signs  Vitals:    03/18/24 1301   BP: 138/68   BP Location: Right arm   Pulse: 82   Temp: 97.6 °F (36.4 °C)   TempSrc: Oral   SpO2: 98%   Weight: 95.3 kg (210 lb)   Height: 5' 10" (1.778 m)        Physical Exam non ill-appearing.  Abdomen is soft and nontender    ASSESSMENT/PLAN:  1. Diverticulitis  I really suspect this was just gastroenteritis and not diverticulitis as he never had any abdominal pain.  He go ahead and stop the antibiotics at this time    2. Type 2 diabetes mellitus without complication, with long-term current use of insulin  He is back on Jardiance and he can restart his tirzepatide now  Overview:  Lab Results   Component Value Date    HGBA1C 7.1 (H) 11/15/2023    HGBA1C 7.5 (H) 05/04/2023    HGBA1C 7.4 (H) 09/10/2021               Follow Up:  No follow-ups on file.            "

## 2024-03-18 NOTE — PHYSICIAN QUERY
PT Name: Juanita Rosa  MR #: [id number]     DOCUMENTATION CLARIFICATION     CDS/: Camden Rangel, RN, BSN   Contact information: [email or web address]    This form is a permanent document in the medical record.     Query Date: March 18, 2024    By submitting this query, we are merely seeking further clarification of documentation.  Please utilize your independent clinical judgment when addressing the question(s) below.  The Medical Record contains the following:  Indicators Supporting Clinical Findings Location in Medical Record   X HR         RR          BP        Temp Vital Signs (24h Range):  Temp:  [98 °F (36.7 °C)] 98 °F (36.7 °C)  Pulse:  [102-116] 102  Resp:  [20] 20  SpO2:  [95 %-100 %] 100 %  BP: (129-156)/(77-88) 141/79   Hospital Medicine H&P 3/11/2024   X Lactic Acid          Procalcitonin Lactic Acid level   Latest Reference Range & Units 03/11/24 17:22   Lactic Acid Level 0.4 - 2.0 mmol/L 1.0    Lab Results 3/11/2024   X WBC           Bands          CRP  WBC   Latest Reference Range & Units 03/11/24 16:23 03/12/24 04:16 03/13/24 04:17   WBC 4.00 - 11.50 x10(3)/mcL 20.41 (H) 10.87 7.01   (H): Data is abnormally high   Lab Results 3/11/2024 - 3/13/2024     X Culture(s) Blood Culture X2 - no growth after 5 days      Stool Culture -   Negative for Salmonella, Shigella, Campylobacter, Vibrio, Aeromonas, Pleisiomonas,Yersinia, or Shiga Toxin 1 and 2.   Lab Results 3/11/2024       Lab results 3/13/2024    AMS, Confusion, LOC, etc.     X Organ Dysfunction/Failure Metabolic acidosis Hospital Medicine H&P 3/11/2024   X Bacteremia or Sepsis / Septic Final Active Diagnoses:     Diagnosis Date Noted POA    PRINCIPAL PROBLEM:  Acute colitis [K52.9] 03/11/2024 Yes    Sepsis [A41.9] 03/11/2024 Yes    Metabolic acidosis [E87.20] 03/11/2024 Yes    Hospital Medicine Discharge Summary 3/13/2024   X Known or Suspected Source of Infection documented Acute colitis   Hospital Medicine H&P 3/11/2024    (Failed)  Outpatient Treatment     X Medication START taking these medications       ciprofloxacin HCl 500 MG tablet  Commonly known as: CIPRO  Take 1 tablet (500 mg total) by mouth every 12 (twelve) hours. for 5 days      metroNIDAZOLE 500 MG tablet  Commonly known as: FLAGYL  Take 1 tablet (500 mg total) by mouth every 8 (eight) hours. for 7 days    Hospital Medicine Discharge Summary 3/13/2024    X Treatment Continue ciprofloxacin 400 mg IV every 12 hours, metronidazole 500 mg IV every 8 hours, IV fluids, and as needed antiemetics.  Stool studies will be sent on admission and he will be started on a clear liquid diet which can be advanced as tolerated.   Hospital Medicine H&P 3/11/2024    X Other He was in his normal state of health until Saaturday 3/10/2024 when he developed nausea, vomiting x 2, belching, diarrhea with >20 loose stools daily, and decreased appetite. He had abdominal bloating yesterday but he denies any fevers, chills, blood in the stool, chest pain, shortness of breath, or cough. He became lethargic and weak today which is what prompted him to come to [facility] for further evaluation.       pt admitted with collitis, improving on po abx and still with some loose stool, not watery, tolerating diet.  PCR negative for c. Diff.  GI PCR complete is still pending, ander d/c home with po cipro and flagyl, f/u with PCP 1 week   Hospital Medicine H&P 3/11/2024                 Hospital Medicine Discharge Summary 3/13/2024            Due to the conflicting clinical picture, please clinically validate the diagnosis of __Sepsis____.    If validated, please provide additional clinical support for the diagnosis.       [  x ] Above stated diagnosis is not confirmed and/or it has been ruled out     [   ] Above stated diagnosis is confirmed.     Please specify clinical support (signs, symptoms, and treatment) for  the confirmed diagnosis: ______________________________________     [   ] Above stated  diagnosis is not confirmed and/or it has been ruled out,    Other diagnosis ruled in (please  specify):________________________     [   ] Other clarification (please specify): ___________________         Please document in your progress notes daily for the duration of treatment until resolved and include in your discharge summary.

## 2024-04-26 DIAGNOSIS — F32.A DEPRESSION, UNSPECIFIED DEPRESSION TYPE: Primary | ICD-10-CM

## 2024-04-26 RX ORDER — FLUOXETINE HYDROCHLORIDE 20 MG/1
20 CAPSULE ORAL
Qty: 90 CAPSULE | Refills: 1 | Status: SHIPPED | OUTPATIENT
Start: 2024-04-26

## 2024-05-01 ENCOUNTER — PATIENT MESSAGE (OUTPATIENT)
Dept: FAMILY MEDICINE | Facility: CLINIC | Age: 54
End: 2024-05-01
Payer: COMMERCIAL

## 2024-05-01 RX ORDER — PHENTERMINE HYDROCHLORIDE 37.5 MG/1
37.5 TABLET ORAL DAILY
Qty: 30 TABLET | Refills: 2 | Status: SHIPPED | OUTPATIENT
Start: 2024-05-01 | End: 2024-07-30

## 2024-05-06 ENCOUNTER — PATIENT MESSAGE (OUTPATIENT)
Dept: FAMILY MEDICINE | Facility: CLINIC | Age: 54
End: 2024-05-06
Payer: COMMERCIAL

## 2024-05-06 DIAGNOSIS — H92.09 OTALGIA, UNSPECIFIED LATERALITY: Primary | ICD-10-CM

## 2024-05-07 ENCOUNTER — PATIENT MESSAGE (OUTPATIENT)
Dept: FAMILY MEDICINE | Facility: CLINIC | Age: 54
End: 2024-05-07
Payer: COMMERCIAL

## 2024-05-15 ENCOUNTER — OFFICE VISIT (OUTPATIENT)
Dept: FAMILY MEDICINE | Facility: CLINIC | Age: 54
End: 2024-05-15
Payer: COMMERCIAL

## 2024-05-15 VITALS
BODY MASS INDEX: 29.35 KG/M2 | HEIGHT: 70 IN | DIASTOLIC BLOOD PRESSURE: 72 MMHG | SYSTOLIC BLOOD PRESSURE: 122 MMHG | HEART RATE: 80 BPM | WEIGHT: 205 LBS | OXYGEN SATURATION: 96 % | TEMPERATURE: 98 F

## 2024-05-15 DIAGNOSIS — H92.01 OTALGIA OF RIGHT EAR: Primary | ICD-10-CM

## 2024-05-15 DIAGNOSIS — J32.9 SINUSITIS, UNSPECIFIED CHRONICITY, UNSPECIFIED LOCATION: ICD-10-CM

## 2024-05-15 PROBLEM — A41.9 SEPSIS: Status: RESOLVED | Noted: 2024-03-11 | Resolved: 2024-05-15

## 2024-05-15 PROBLEM — E87.20 METABOLIC ACIDOSIS: Status: RESOLVED | Noted: 2024-03-11 | Resolved: 2024-05-15

## 2024-05-15 PROCEDURE — 99214 OFFICE O/P EST MOD 30 MIN: CPT | Mod: ,,, | Performed by: NURSE PRACTITIONER

## 2024-05-15 PROCEDURE — 1159F MED LIST DOCD IN RCRD: CPT | Mod: CPTII,,, | Performed by: NURSE PRACTITIONER

## 2024-05-15 PROCEDURE — 3078F DIAST BP <80 MM HG: CPT | Mod: CPTII,,, | Performed by: NURSE PRACTITIONER

## 2024-05-15 PROCEDURE — 4010F ACE/ARB THERAPY RXD/TAKEN: CPT | Mod: CPTII,,, | Performed by: NURSE PRACTITIONER

## 2024-05-15 PROCEDURE — 3074F SYST BP LT 130 MM HG: CPT | Mod: CPTII,,, | Performed by: NURSE PRACTITIONER

## 2024-05-15 PROCEDURE — 3008F BODY MASS INDEX DOCD: CPT | Mod: CPTII,,, | Performed by: NURSE PRACTITIONER

## 2024-05-15 PROCEDURE — 1160F RVW MEDS BY RX/DR IN RCRD: CPT | Mod: CPTII,,, | Performed by: NURSE PRACTITIONER

## 2024-05-15 RX ORDER — LEVOFLOXACIN 500 MG/1
500 TABLET, FILM COATED ORAL DAILY
COMMUNITY
Start: 2024-05-08 | End: 2024-05-31

## 2024-05-15 RX ORDER — PREDNISONE 20 MG/1
40 TABLET ORAL DAILY
Qty: 10 TABLET | Refills: 0 | Status: SHIPPED | OUTPATIENT
Start: 2024-05-15 | End: 2024-05-20

## 2024-05-15 RX ORDER — IBUPROFEN 600 MG/1
600 TABLET ORAL EVERY 6 HOURS
COMMUNITY
Start: 2024-04-11

## 2024-05-15 RX ORDER — OXYCODONE AND ACETAMINOPHEN 5; 325 MG/1; MG/1
1 TABLET ORAL EVERY 6 HOURS PRN
COMMUNITY
Start: 2024-04-11 | End: 2024-05-23 | Stop reason: SDUPTHER

## 2024-05-15 RX ORDER — BROMFENAC SODIUM 0.7 MG/ML
1 SOLUTION/ DROPS OPHTHALMIC DAILY
COMMUNITY
Start: 2024-04-22

## 2024-05-15 RX ORDER — MOMETASONE FUROATE 1 MG/G
15 CREAM TOPICAL DAILY PRN
COMMUNITY
Start: 2024-05-08

## 2024-05-15 RX ORDER — FLUOCINOLONE ACETONIDE 0.11 MG/ML
20 OIL AURICULAR (OTIC)
COMMUNITY
Start: 2024-05-08

## 2024-05-15 RX ORDER — IPRATROPIUM BROMIDE 42 UG/1
1 SPRAY, METERED NASAL 2 TIMES DAILY
COMMUNITY
Start: 2024-05-08

## 2024-05-15 NOTE — PROGRESS NOTES
"Patient ID: Juanita Rosa  : 1970    Chief Complaint: chronic ear pain (Pt went to see his ENT and is following up for a CT in 2 weeks )    Allergies: Patient is allergic to sulfa (sulfonamide antibiotics).     History of Present Illness:  The patient is a 53 y.o. White male who presents to clinic for follow up on chronic ear pain (Pt went to see his ENT and is following up for a CT in 2 weeks )     Patient of Dr Reyess. He has been having right ear pain for close to an year now, it began after a root canal last year. He did see primary with same complaint. Was told that nothing was seen. He went back to oral surgeon, they did an xray and he was treated for a dental infection (Amoxicillin 875 BID x 7 days); this did not help. He had that tooth extracted about 3 weeks ago. Pain still did not resolve. He was then referred to ENT and was evaluated by Dr Acuna, again was given another antibiotic (Levafloxacin 500 mg daily x 21 days) along with some nasal sprays and antihistamines. Again that has not helped. At this point he is very frustrated and does not know where to turn. Denies hearing loss. Does have some ringing in the ear and feels a pressure. He felt "feverish" this morning." Denies issues with balance. No facial pain, swelling. No dental pain.     Social History:  reports that he quit smoking about 25 years ago. His smoking use included cigarettes. He has never used smokeless tobacco. He reports current alcohol use. He reports that he does not use drugs.    Past Medical History:  has a past medical history of BPH (benign prostatic hyperplasia), Depression, Erectile dysfunction, History of colon polyps, History of pneumothorax, Hyperlipidemia, Hyperlipidemia, Hypertension, and Type II diabetes mellitus.    Current Medications:  Current Outpatient Medications   Medication Instructions    fluocinolone acetonide oiL 0.01 % Drop 20 mLs    FLUoxetine 20 mg, Oral     mg, Oral, Every 6 hours    " "insulin degludec (TRESIBA FLEXTOUCH U-200) 50 Units, Subcutaneous, Daily    ipratropium (ATROVENT) 42 mcg (0.06 %) nasal spray 1 spray, Each Nostril, 2 times daily    JARDIANCE 25 mg, Oral, Daily    levoFLOXacin (LEVAQUIN) 500 mg, Oral, Daily    lisinopriL 10 mg, Oral    metFORMIN (GLUCOPHAGE) 1,000 mg, Oral, 2 times daily    mometasone 0.1% (ELOCON) 15 g, Topical (Top), Daily PRN    oxyCODONE-acetaminophen (PERCOCET) 5-325 mg per tablet 1 tablet, Oral, Every 6 hours PRN    pen needle, diabetic (SURE COMFORT PEN NEEDLE) 31 gauge x 3/16" Ndle USE ONCE DAILY WITH INSULIN    phentermine (ADIPEX-P) 37.5 mg, Oral, Daily    predniSONE (DELTASONE) 40 mg, Oral, Daily    PROLENSA 0.07 % Drop 1 drop, Ophthalmic, Daily    rosuvastatin (CRESTOR) 10 mg, Oral    tadalafiL (CIALIS) 20 mg, Oral, Daily    tamsulosin (FLOMAX) 0.4 mg, Oral, Daily    tirzepatide 7.5 mg, Subcutaneous, Every 7 days       Review of Systems   See HPI    Visit Vitals  /72 (BP Location: Left arm)   Pulse 80   Temp 98.1 °F (36.7 °C) (Temporal)   Ht 5' 10" (1.778 m)   Wt 93 kg (205 lb)   SpO2 96%   BMI 29.41 kg/m²       Physical Exam  Vitals reviewed.   Constitutional:       Appearance: Normal appearance.   HENT:      Head:      Jaw: No tenderness or pain on movement.      Right Ear: Tympanic membrane, ear canal and external ear normal.      Left Ear: Tympanic membrane, ear canal and external ear normal.      Ears:      Comments: No mastoid tenderness     Nose: Nose normal.      Mouth/Throat:      Mouth: Mucous membranes are moist.      Pharynx: Oropharynx is clear.   Eyes:      Conjunctiva/sclera: Conjunctivae normal.   Cardiovascular:      Heart sounds: Normal heart sounds.   Pulmonary:      Breath sounds: Normal breath sounds.   Musculoskeletal:      Cervical back: Neck supple.       *Consulted with Dr Monae about treatment plan. He agreed with plan to proceed with imaging at this point.      Assessment & Plan:  1. Otalgia of right " ear  Comments:  Failed outpatient treatment for chronic sinusitis; has addressed dental issues. Pain is intense and persistent for prolonged period of time.   MRI brain    2. Sinusitis, unspecified chronicity, unspecified location  Comments:  Prednisone 40 mg daily x 5 days.   Monitor CBG closely while taking this medicaitons.  Orders:  -     predniSONE (DELTASONE) 20 MG tablet; Take 2 tablets (40 mg total) by mouth once daily. for 5 days  Dispense: 10 tablet; Refill: 0         Future Appointments   Date Time Provider Department Center   5/31/2024  8:20 AM LAB, [facility] [place] LABORATORY DRAW STATION [facility] [place] MANUELA Mccloud   6/7/2024  8:30 AM Tony Monae MD [facility]       No follow-ups on file. Call sooner if needed.    BERNIE Batista    Lab Frequency Next Occurrence   Ambulatory referral/consult to Gastroenterology Once 05/17/2023   Ambulatory referral/consult to Podiatry Once 05/17/2023   Hemoglobin A1C Once 05/23/2024   Comprehensive Metabolic Panel Once 05/23/2024   Ambulatory referral/consult to ENT Once 05/13/2024

## 2024-05-20 ENCOUNTER — HOSPITAL ENCOUNTER (OUTPATIENT)
Dept: RADIOLOGY | Facility: HOSPITAL | Age: 54
Discharge: HOME | End: 2024-05-20
Attending: NURSE PRACTITIONER
Payer: COMMERCIAL

## 2024-05-20 DIAGNOSIS — H92.01 OTALGIA OF RIGHT EAR: ICD-10-CM

## 2024-05-20 PROCEDURE — 70553 MRI BRAIN STEM W/O & W/DYE: CPT | Mod: TC

## 2024-05-20 PROCEDURE — A9577 INJ MULTIHANCE: HCPCS | Performed by: NURSE PRACTITIONER

## 2024-05-20 PROCEDURE — 25500020 PHARM REV CODE 255: Performed by: NURSE PRACTITIONER

## 2024-05-20 RX ADMIN — GADOBENATE DIMEGLUMINE 20 ML: 529 INJECTION, SOLUTION INTRAVENOUS at 06:05

## 2024-05-21 ENCOUNTER — PATIENT MESSAGE (OUTPATIENT)
Dept: FAMILY MEDICINE | Facility: CLINIC | Age: 54
End: 2024-05-21
Payer: COMMERCIAL

## 2024-05-21 ENCOUNTER — TELEPHONE (OUTPATIENT)
Dept: FAMILY MEDICINE | Facility: CLINIC | Age: 54
End: 2024-05-21
Payer: COMMERCIAL

## 2024-05-21 NOTE — TELEPHONE ENCOUNTER
----- Message from Joselyn France, FNP-C sent at 5/21/2024  9:01 AM CDT -----  Is a patient of Dr. Monae so that I saw last week with otalgia.  On MRI was ordered and I have reviewed the results well as sent them to Dr. Monae who also reviewed.  The MRI is completely normal and does not show any abnormality.  We will make sure that ENT, Dr. Acuna also gets these results.    Please forward MRI report to Dr Acuna.

## 2024-05-23 ENCOUNTER — TELEPHONE (OUTPATIENT)
Dept: FAMILY MEDICINE | Facility: CLINIC | Age: 54
End: 2024-05-23
Payer: COMMERCIAL

## 2024-05-23 DIAGNOSIS — G50.0 TRIGEMINAL NEURALGIA: Primary | ICD-10-CM

## 2024-05-23 RX ORDER — OXYCODONE AND ACETAMINOPHEN 5; 325 MG/1; MG/1
1 TABLET ORAL EVERY 6 HOURS PRN
Qty: 20 TABLET | Refills: 0 | Status: SHIPPED | OUTPATIENT
Start: 2024-05-23 | End: 2024-06-03 | Stop reason: SDUPTHER

## 2024-05-23 NOTE — TELEPHONE ENCOUNTER
Dr. Sepulveda was supposed to send in some pain meds for patient. States that they haven't been sent to [facility] yet. She would like this done at least by tomorrow. Please advise.

## 2024-05-24 ENCOUNTER — TELEPHONE (OUTPATIENT)
Dept: NEUROLOGY | Facility: CLINIC | Age: 54
End: 2024-05-24
Payer: COMMERCIAL

## 2024-05-24 NOTE — TELEPHONE ENCOUNTER
I spoke with patient wife and confirmed the appt patient is scheduled for and she did verbalize understanding.

## 2024-05-24 NOTE — TELEPHONE ENCOUNTER
----- Message from Mary Sloan sent at 5/24/2024  1:49 PM CDT -----  Contact: wife   .Type: Patient Call Back        Who called:      Patient   What is the request in detail:  Pts wife called in on behalf of patient to schedule an appt . Pt was able to be schedule but if there is any cancellations Pt needs to be notified atleast 3 hours before appt     Can the clinic reply by BETTYNER?      n/a     Would the patient rather a call back or a response via [facility]?   call back      Best call back number:    [phone]  Or

## 2024-05-31 ENCOUNTER — OFFICE VISIT (OUTPATIENT)
Dept: NEUROLOGY | Facility: CLINIC | Age: 54
End: 2024-05-31
Payer: COMMERCIAL

## 2024-05-31 ENCOUNTER — PATIENT OUTREACH (OUTPATIENT)
Dept: ADMINISTRATIVE | Facility: HOSPITAL | Age: 54
End: 2024-05-31
Payer: COMMERCIAL

## 2024-05-31 VITALS
RESPIRATION RATE: 16 BRPM | WEIGHT: 202.19 LBS | OXYGEN SATURATION: 99 % | HEART RATE: 102 BPM | BODY MASS INDEX: 28.95 KG/M2 | DIASTOLIC BLOOD PRESSURE: 78 MMHG | HEIGHT: 70 IN | SYSTOLIC BLOOD PRESSURE: 122 MMHG

## 2024-05-31 DIAGNOSIS — Z51.81 THERAPEUTIC DRUG MONITORING: ICD-10-CM

## 2024-05-31 DIAGNOSIS — B02.29 POSTHERPETIC NEURALGIA: ICD-10-CM

## 2024-05-31 DIAGNOSIS — E11.9 TYPE 2 DIABETES MELLITUS WITHOUT COMPLICATION, WITH LONG-TERM CURRENT USE OF INSULIN: ICD-10-CM

## 2024-05-31 DIAGNOSIS — E78.5 HYPERLIPIDEMIA, UNSPECIFIED HYPERLIPIDEMIA TYPE: ICD-10-CM

## 2024-05-31 DIAGNOSIS — Z79.4 TYPE 2 DIABETES MELLITUS WITHOUT COMPLICATION, WITH LONG-TERM CURRENT USE OF INSULIN: ICD-10-CM

## 2024-05-31 DIAGNOSIS — M51.34 DEGENERATION OF INTERVERTEBRAL DISC OF THORACIC REGION: ICD-10-CM

## 2024-05-31 DIAGNOSIS — Z86.010 HISTORY OF COLONIC POLYPS: ICD-10-CM

## 2024-05-31 DIAGNOSIS — F32.A DEPRESSION, UNSPECIFIED DEPRESSION TYPE: ICD-10-CM

## 2024-05-31 DIAGNOSIS — G50.0 RIGHT TRIGEMINAL NEURALGIA: Primary | ICD-10-CM

## 2024-05-31 DIAGNOSIS — N40.0 BENIGN PROSTATIC HYPERPLASIA, UNSPECIFIED WHETHER LOWER URINARY TRACT SYMPTOMS PRESENT: ICD-10-CM

## 2024-05-31 DIAGNOSIS — I10 PRIMARY HYPERTENSION: ICD-10-CM

## 2024-05-31 DIAGNOSIS — Z12.11 SPECIAL SCREENING FOR MALIGNANT NEOPLASMS, COLON: Primary | ICD-10-CM

## 2024-05-31 PROBLEM — E83.42 HYPOMAGNESEMIA: Status: RESOLVED | Noted: 2024-03-11 | Resolved: 2024-05-31

## 2024-05-31 PROBLEM — K52.9 ACUTE COLITIS: Status: RESOLVED | Noted: 2024-03-11 | Resolved: 2024-05-31

## 2024-05-31 PROCEDURE — 1159F MED LIST DOCD IN RCRD: CPT | Mod: CPTII,S$GLB,, | Performed by: PSYCHIATRY & NEUROLOGY

## 2024-05-31 PROCEDURE — 99205 OFFICE O/P NEW HI 60 MIN: CPT | Mod: S$GLB,,, | Performed by: PSYCHIATRY & NEUROLOGY

## 2024-05-31 PROCEDURE — 3008F BODY MASS INDEX DOCD: CPT | Mod: CPTII,S$GLB,, | Performed by: PSYCHIATRY & NEUROLOGY

## 2024-05-31 PROCEDURE — 4010F ACE/ARB THERAPY RXD/TAKEN: CPT | Mod: CPTII,S$GLB,, | Performed by: PSYCHIATRY & NEUROLOGY

## 2024-05-31 PROCEDURE — 3044F HG A1C LEVEL LT 7.0%: CPT | Mod: CPTII,S$GLB,, | Performed by: PSYCHIATRY & NEUROLOGY

## 2024-05-31 PROCEDURE — 99999 PR PBB SHADOW E&M-EST. PATIENT-LVL V: CPT | Mod: PBBFAC,,, | Performed by: PSYCHIATRY & NEUROLOGY

## 2024-05-31 PROCEDURE — 3074F SYST BP LT 130 MM HG: CPT | Mod: CPTII,S$GLB,, | Performed by: PSYCHIATRY & NEUROLOGY

## 2024-05-31 PROCEDURE — 3078F DIAST BP <80 MM HG: CPT | Mod: CPTII,S$GLB,, | Performed by: PSYCHIATRY & NEUROLOGY

## 2024-05-31 RX ORDER — CARBAMAZEPINE 100 MG/1
100 TABLET, EXTENDED RELEASE ORAL 2 TIMES DAILY
Qty: 180 TABLET | Refills: 3 | Status: SHIPPED | OUTPATIENT
Start: 2024-05-31 | End: 2024-06-10 | Stop reason: SDUPTHER

## 2024-05-31 RX ORDER — BACLOFEN 10 MG/1
10 TABLET ORAL 2 TIMES DAILY
Qty: 180 TABLET | Refills: 3 | Status: SHIPPED | OUTPATIENT
Start: 2024-05-31 | End: 2024-06-10 | Stop reason: SDUPTHER

## 2024-05-31 NOTE — PROGRESS NOTES
Subjective:       Patient ID: Juanita Rosa is a 53 y.o. male.    Chief Complaint: Facial Pain          HPI    The patient presented on 05-, accompanied by his wife, for evaluation of RT facial pain.     The patient started having pain in the face in 2023. The pain is located in the jaw and around the ear.He was initially evaluated by dentist and underwent root canal and extraction followed by ENT evaluation with no relief.  Of note, the patient did experience similar problem in the early 2000's. He also suffers from recurrent RT V3 Zoster (Shingles). The patient describes the pain as very intense 10/10 throbbing pain lasting for few seconds on on/off basis with no loss of vision, hearing or balance. Denied weakness or numbness. Has been taking Ibuprofen and Percocet ATC with no improvement.        Review of Systems   Constitutional:  Negative for appetite change and fatigue.   HENT:  Negative for hearing loss and tinnitus.    Eyes:  Negative for photophobia and visual disturbance.   Respiratory:  Negative for apnea and shortness of breath.    Cardiovascular:  Negative for chest pain and palpitations.   Gastrointestinal:  Negative for nausea and vomiting.   Endocrine: Negative for cold intolerance and heat intolerance.   Genitourinary:  Negative for difficulty urinating and urgency.   Musculoskeletal:  Negative for arthralgias, back pain, gait problem, joint swelling, myalgias, neck pain and neck stiffness.   Skin:  Negative for color change and rash.   Allergic/Immunologic: Negative for environmental allergies and immunocompromised state.   Neurological:  Negative for dizziness, tremors, seizures, syncope, facial asymmetry, speech difficulty, weakness, light-headedness, numbness and headaches.        RT Facial Pain   Hematological:  Negative for adenopathy. Does not bruise/bleed easily.   Psychiatric/Behavioral:  Negative for agitation, behavioral problems, confusion, decreased concentration, dysphoric mood,  "hallucinations, self-injury, sleep disturbance and suicidal ideas. The patient is not hyperactive.                Current Outpatient Medications:     fluocinolone acetonide oiL 0.01 % Drop, 20 mLs., Disp: , Rfl:     FLUoxetine 20 MG capsule, TAKE 1 CAPSULE ONCE DAILY, Disp: 90 capsule, Rfl: 1     mg tablet, Take 600 mg by mouth every 6 (six) hours., Disp: , Rfl:     insulin degludec (TRESIBA FLEXTOUCH U-200) 200 unit/mL (3 mL) insulin pen, Inject 50 Units into the skin once daily., Disp: 9 pen , Rfl: 2    ipratropium (ATROVENT) 42 mcg (0.06 %) nasal spray, 1 spray by Each Nostril route 2 (two) times daily., Disp: , Rfl:     JARDIANCE 25 mg tablet, Take 1 tablet (25 mg total) by mouth once daily., Disp: 90 tablet, Rfl: 1    lisinopriL 10 MG tablet, TAKE 1 TABLET DAILY, Disp: 90 tablet, Rfl: 1    metFORMIN (GLUCOPHAGE) 1000 MG tablet, TAKE 1 TABLET TWICE A DAY, Disp: 180 tablet, Rfl: 3    mometasone 0.1% (ELOCON) 0.1 % cream, Apply 15 g topically daily as needed., Disp: , Rfl:     pen needle, diabetic (SURE COMFORT PEN NEEDLE) 31 gauge x 3/16" Ndle, USE ONCE DAILY WITH INSULIN, Disp: 100 each, Rfl: 11    phentermine (ADIPEX-P) 37.5 mg tablet, Take 1 tablet (37.5 mg total) by mouth once daily., Disp: 30 tablet, Rfl: 2    PROLENSA 0.07 % Drop, Apply 1 drop to eye Daily., Disp: , Rfl:     rosuvastatin (CRESTOR) 10 MG tablet, TAKE 1 TABLET ONCE DAILY, Disp: 90 tablet, Rfl: 3    tadalafiL (CIALIS) 5 MG tablet, Take 4 tablets (20 mg total) by mouth once daily., Disp: 360 tablet, Rfl: 3    tamsulosin (FLOMAX) 0.4 mg Cap, Take 1 capsule (0.4 mg total) by mouth once daily., Disp: 90 capsule, Rfl: 1    tirzepatide 7.5 mg/0.5 mL PnIj, Inject 7.5 mg into the skin every 7 days., Disp: 12 pen , Rfl: 1    baclofen (LIORESAL) 10 MG tablet, Take 1 tablet (10 mg total) by mouth 2 (two) times daily., Disp: 180 tablet, Rfl: 3    carBAMazepine (TEGRETOL XR) 100 MG 12 hr tablet, Take 1 tablet (100 mg total) by mouth 2 (two) times " daily., Disp: 180 tablet, Rfl: 3    Past Medical History:   Diagnosis Date    BPH (benign prostatic hyperplasia)     Depression     Erectile dysfunction     History of colon polyps     History of pneumothorax     Hyperlipidemia     Hyperlipidemia     Hypertension     Type II diabetes mellitus        Past Surgical History:   Procedure Laterality Date    ANTERIOR CERVICAL DISCECTOMY W/ FUSION      CARPAL TUNNEL RELEASE Left     CHEST TUBE INSERTION      COLONOSCOPY      EXCISIONAL HEMORRHOIDECTOMY      LEFT HEART CATHETERIZATION      SHOULDER SURGERY Left     THORACIC DISCECTOMY      VASECTOMY         Social History     Socioeconomic History    Marital status:    Tobacco Use    Smoking status: Former     Current packs/day: 0.00     Types: Cigarettes     Quit date:      Years since quittin.4    Smokeless tobacco: Never   Substance and Sexual Activity    Alcohol use: Yes     Comment: social    Drug use: Never    Sexual activity: Not Currently     Social Determinants of Health     Financial Resource Strain: Low Risk  (3/12/2024)    Overall Financial Resource Strain (CARDIA)     Difficulty of Paying Living Expenses: Not hard at all   Food Insecurity: No Food Insecurity (3/12/2024)    Hunger Vital Sign     Worried About Running Out of Food in the Last Year: Never true     Ran Out of Food in the Last Year: Never true   Transportation Needs: No Transportation Needs (3/12/2024)    PRAPARE - Transportation     Lack of Transportation (Medical): No     Lack of Transportation (Non-Medical): No   Physical Activity: Inactive (3/12/2024)    Exercise Vital Sign     Days of Exercise per Week: 0 days     Minutes of Exercise per Session: 0 min   Stress: No Stress Concern Present (3/12/2024)    [employer] [place] [employer] - Occupational Stress Questionnaire     Feeling of Stress : Not at all   Housing Stability: Unknown (3/12/2024)    Housing Stability Vital Sign     Unable to Pay for Housing in the Last  Year: No     Number of Places Lived in the Last Year: 1             Past/Current Medical/Surgical History, Past/Current Social History, Past/Current Family History and Past/Current Medications were reviewed in detail.        Objective:           VITAL SIGNS WERE REVIEWED      GENERAL APPEARANCE:     The patient looks comfortable.    BMI 29.01    No signs of respiratory distress.    Normal breathing pattern.    No dysmorphic features    Normal eye contact.       GENERAL MEDICAL EXAM:    HEENT:  Head is atraumatic normocephalic.     FUNDUSCOPIC (OPHTHALMOSCOPIC) EXAMINATION showed no disc edema (papilledema).      NECK: No JVD. No visible lesions or goiters.     CHEST-CARDIOPULMONARY: No cyanosis. No tachypnea. Normal respiratory effort.    LDYOULP-[id number]-DABXXCZFJQ: No jaundice. No stomas or lesions. No visible hernias. No catheters.     SKIN, HAIR, NAILS: No pathognomonic skin rash.No neurofibromatosis. No visible lesions.No stigmata of autoimmune disease. No clubbing.    LIMBS: No varicose veins. No visible swelling.    MUSCULOSKELETAL: No visible deformities.No visible lesions.             Neurologic Exam     Mental Status   Oriented to person, place, and time.   Follows 3 step commands.   Attention: normal. Concentration: normal.   Speech: speech is normal   Level of consciousness: alert  Able to name object. Able to repeat. Normal comprehension.     Cranial Nerves   Cranial nerves II through XII intact.     CN II   Visual fields full to confrontation.   Visual acuity: normal  Right visual field deficit: none  Left visual field deficit: none     CN III, IV, VI   Pupils are equal, round, and reactive to light.  Extraocular motions are normal.   Right pupil: Size: 2 mm. Shape: regular. Reactivity: brisk. Consensual response: intact. Accommodation: intact.   Left pupil: Size: 2 mm. Shape: regular. Reactivity: brisk. Consensual response: intact. Accommodation: intact.   CN III: no CN III palsy  CN VI: no CN  VI palsy  Nystagmus: none   Diplopia: none  Ophthalmoparesis: none  Upgaze: normal  Downgaze: normal  Conjugate gaze: present  Vestibulo-ocular reflex: present    CN V   Facial sensation intact.   Right facial sensation deficit: none  Left facial sensation deficit: none  Jaw jerk: normal    CN VII   Facial expression full, symmetric.   Right facial weakness: none  Left facial weakness: none    CN VIII   CN VIII normal.   Hearing: intact    CN IX, X   CN IX normal.   CN X normal.   Palate: symmetric    CN XI   CN XI normal.   Right sternocleidomastoid strength: normal  Left sternocleidomastoid strength: normal  Right trapezius strength: normal  Left trapezius strength: normal    CN XII   CN XII normal.   Tongue: not atrophic  Fasciculations: absent  Tongue deviation: none    Motor Exam   Muscle bulk: normal  Overall muscle tone: normal  Right arm tone: normal  Left arm tone: normal  Right arm pronator drift: absent  Left arm pronator drift: absent  Right leg tone: normal  Left leg tone: normal    Strength   Strength 5/5 throughout.   Right neck flexion: 5/5  Left neck flexion: 5/5  Right neck extension: 5/5  Left neck extension: 5/5  Right deltoid: 5/5  Left deltoid: 5/5  Right biceps: 5/5  Left biceps: 5/5  Right triceps: 5/5  Left triceps: 5/5  Right wrist flexion: 5/5  Left wrist flexion: 5/5  Right wrist extension: 5/5  Left wrist extension: 5/5  Right interossei: 5/5  Left interossei: 5/5  Right iliopsoas: 5/5  Left iliopsoas: 5/5  Right quadriceps: 5/5  Left quadriceps: 5/5  Right hamstrin/5  Left hamstrin/5  Right glutei: 5/5  Left glutei: 5/5  Right anterior tibial: 5/5  Left anterior tibial: 5/5  Right posterior tibial: 5/5  Left posterior tibial: 5/5  Right peroneal: 5/5  Left peroneal: 5/5  Right gastroc: 5/5  Left gastroc: 5/5    Sensory Exam   Light touch normal.   Right arm light touch: normal  Left arm light touch: normal  Right leg light touch: normal  Left leg light touch: normal  Vibration  normal.   Right arm vibration: normal  Left arm vibration: normal  Right leg vibration: normal  Left leg vibration: normal  Proprioception normal.   Right arm proprioception: normal  Left arm proprioception: normal  Right leg proprioception: normal  Left leg proprioception: normal  Pinprick normal.   Right arm pinprick: normal  Left arm pinprick: normal  Right leg pinprick: normal  Left leg pinprick: normal  Graphesthesia: normal  Stereognosis: normal    Gait, Coordination, and Reflexes     Gait  Gait: normal    Coordination   Romberg: negative  Finger to nose coordination: normal  Heel to shin coordination: normal  Tandem walking coordination: normal    Tremor   Resting tremor: absent  Intention tremor: absent  Action tremor: absent    Reflexes   Right brachioradialis: 2+  Left brachioradialis: 2+  Right biceps: 2+  Left biceps: 2+  Right triceps: 2+  Left triceps: 2+  Right patellar: 2+  Left patellar: 2+  Right achilles: 2+  Left achilles: 2+  Right plantar: normal  Left plantar: normal  Right Carrillo: absent  Left Carrillo: absent  Right ankle clonus: absent  Left ankle clonus: absent  Right pendular knee jerk: absent  Left pendular knee jerk: absent      Lab Results   Component Value Date    WBC 7.01 03/13/2024    HGB 14.7 03/13/2024    HCT 43.2 03/13/2024    MCV 86.6 03/13/2024     03/13/2024       Sodium   Date Value Ref Range Status   05/30/2024 139 134 - 144 mmol/L Final     Potassium   Date Value Ref Range Status   05/30/2024 4.4 3.5 - 5.2 mmol/L Final     Chloride   Date Value Ref Range Status   05/30/2024 103 96 - 106 mmol/L Final     CO2   Date Value Ref Range Status   05/30/2024 22 20 - 29 mmol/L Final     Glucose   Date Value Ref Range Status   05/30/2024 99 70 - 99 mg/dL Final     BUN   Date Value Ref Range Status   05/30/2024 20 6 - 24 mg/dL Final     Creatinine   Date Value Ref Range Status   05/30/2024 0.98 0.76 - 1.27 mg/dL Final     Calcium   Date Value Ref Range Status   05/30/2024 10.1  8.7 - 10.2 mg/dL Final     Albumin   Date Value Ref Range Status   05/30/2024 4.5 3.8 - 4.9 g/dL Final   03/13/2024 3.4 3.4 - 5.0 g/dL Final     Total Bilirubin   Date Value Ref Range Status   05/30/2024 0.5 0.0 - 1.2 mg/dL Final     ALP   Date Value Ref Range Status   03/13/2024 64 50 - 144 unit/L Final     AST   Date Value Ref Range Status   05/30/2024 17 0 - 40 IU/L Final     ALT   Date Value Ref Range Status   05/30/2024 21 0 - 44 IU/L Final     Estimated GFR-Non    Date Value Ref Range Status   09/10/2021 90 >>59 mL/min/1.73 m2 Final       Lab Results   Component Value Date    MGUXAFDH19 442 11/15/2023       Lab Results   Component Value Date    TSH 0.819 05/04/2023         LABORATORY EVALUATION      [id number]    CBC, CMP, TFT, B12 Unremarkable HAC 5.5-8.1    RADIOLOGY EVALUATION       05-    Brain MRI WWO IAC NL.      06-    Brain MRI WWO NL.       NEUROPHYSIOLOGY EVALUATION       PATHOLOGY EVALUATION        NEUROCOGNITIVE AND NEUROPSYCHOLOGY EVALUATION           Reviewed the neuroimaging independently       Assessment:           1. Right trigeminal neuralgia    2. Benign prostatic hyperplasia, unspecified whether lower urinary tract symptoms present    3. 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 Type 2 diabetes mellitus without complication, with long-term current use of insulin    5. History of colonic polyps    6. Hyperlipidemia, unspecified hyperlipidemia type    7. Depression, unspecified depression type    8. Primary hypertension    9. Postherpetic neuralgia    10. Therapeutic drug monitoring          Plan:             TRIGEMINAL NEURALGIA (TN), RIGHT SIDE, V3 BRANCH, ONSET 2023, EPISODIC, SEVERE    POSSIBLE POSTHERPETIC NEURALGIA (PHN )      EVALUATION     Brain MRI with 3D TN WWO.       MEDICAL MANAGEMENT     Start CBZ  mg BID and watch for sedation (Maximum dose 400 mg BID).  I warned the patient about the risk of BMS, liver toxicity and rash.  The patient verbalized full understanding. Repeat CBC and CMP in .     Add Baclofen 10 mg BID (Maximum 20 mg TID). Watch for sedation.       NEXT OPTIONS:      LTG 25 mg BID (Maxim 200 mg BID). Warned the patient about the 3% risk of SJS and recent reports of cardiac arrhythmias.      mg TID (Maximum 1200 mg TID). Watch for sedation, edema, weigh gain and abnormal movements.    PGB 25 mg TID (Maximum 200 mg TID). Watch for sedation, edema, weigh gain and abnormal movements.    INVASIVE TREATMENT     Microvascular decompression has the best outcome for trigeminal neuralgia with about 90% control at 5 years.  This does require craniotomy however with its attendant risks.      Stereotactic radiosurgery is generally about 70% effective at 5 years and generally provides more relief when there is some sensory loss.    Percutaneous glycerol ganglionotomy is easily administered as an outpatient with 60-80% efficacy but pain returns in about 50% of patients with in 5 years.    Gamma Knife is about 60-80% effective but pain returns in about 50% of patients with in 5 years.        MEDICAL/SURGICAL COMORBIDITIES     All relevant medical comorbidities noted and managed by primary care physician and medical care team.          HEALTHY LIFESTYLE AND PREVENTATIVE CARE    The patient to adhere to the age-appropriate health maintenance guidelines including screening tests and vaccinations. The patient to adhere to  healthy lifestyle, optimal weight, exercise, healthy diet, good sleep hygiene and avoiding drugs including smoking, alcohol and recreational drugs.          RTC 3 MONTHS     Jenna Roman MD, FAAN    Attending Neurologist/Epileptologist         Diplomate, American Board of Psychiatry and Neurology    Diplomate, American Board of Clinical Neurophysiology     Fellow, American Academy of Neurology       I spent a total of 66 minutes on the day of the visit.  This includes face to face time and non-face to face time  preparing to see the patient (eg, review of tests), obtaining and/or reviewing separately obtained history, documenting clinical information in the electronic or other health record, independently interpreting results and communicating results to the patient/family/caregiver, or care coordinator.

## 2024-05-31 NOTE — PROGRESS NOTES
Population Health Chart Review & Patient Outreach Details      Additional [facility] Notes:               Updates Requested / Reviewed:      Updated Care Coordination Note, Care Everywhere, and Immunizations Reconciliation Completed or Queried: [place]         Health Maintenance Topics Overdue:      VB Score: 2     Colon Cancer Screening  Foot Exam                       Health Maintenance Topic(s) Outreach Outcomes & Actions Taken:    Colorectal Cancer Screening - Outreach Outcomes & Actions Taken  : Colonoscopy Case Request / Referral / Home Test Order Placed and [facility] referral to endo  placed per MANI in basket message stating that pt agrees to colonoscopy.

## 2024-06-03 RX ORDER — OXYCODONE AND ACETAMINOPHEN 5; 325 MG/1; MG/1
1 TABLET ORAL EVERY 6 HOURS PRN
Qty: 20 TABLET | Refills: 0 | Status: SHIPPED | OUTPATIENT
Start: 2024-06-03 | End: 2024-06-08

## 2024-06-07 ENCOUNTER — TELEPHONE (OUTPATIENT)
Dept: NEUROLOGY | Facility: CLINIC | Age: 54
End: 2024-06-07
Payer: COMMERCIAL

## 2024-06-07 ENCOUNTER — OFFICE VISIT (OUTPATIENT)
Dept: FAMILY MEDICINE | Facility: CLINIC | Age: 54
End: 2024-06-07
Payer: COMMERCIAL

## 2024-06-07 VITALS
TEMPERATURE: 97 F | BODY MASS INDEX: 29.2 KG/M2 | OXYGEN SATURATION: 98 % | DIASTOLIC BLOOD PRESSURE: 82 MMHG | HEIGHT: 70 IN | SYSTOLIC BLOOD PRESSURE: 122 MMHG | HEART RATE: 91 BPM | WEIGHT: 204 LBS

## 2024-06-07 DIAGNOSIS — E78.5 HYPERLIPIDEMIA, UNSPECIFIED HYPERLIPIDEMIA TYPE: ICD-10-CM

## 2024-06-07 DIAGNOSIS — I10 PRIMARY HYPERTENSION: Primary | ICD-10-CM

## 2024-06-07 DIAGNOSIS — Z12.5 SCREENING FOR PROSTATE CANCER: ICD-10-CM

## 2024-06-07 DIAGNOSIS — E11.9 TYPE 2 DIABETES MELLITUS WITHOUT COMPLICATION, WITH LONG-TERM CURRENT USE OF INSULIN: ICD-10-CM

## 2024-06-07 DIAGNOSIS — Z79.4 TYPE 2 DIABETES MELLITUS WITHOUT COMPLICATION, WITH LONG-TERM CURRENT USE OF INSULIN: ICD-10-CM

## 2024-06-07 PROCEDURE — 99214 OFFICE O/P EST MOD 30 MIN: CPT | Mod: ,,, | Performed by: FAMILY MEDICINE

## 2024-06-07 PROCEDURE — 3074F SYST BP LT 130 MM HG: CPT | Mod: CPTII,,, | Performed by: FAMILY MEDICINE

## 2024-06-07 PROCEDURE — 1159F MED LIST DOCD IN RCRD: CPT | Mod: CPTII,,, | Performed by: FAMILY MEDICINE

## 2024-06-07 PROCEDURE — 3044F HG A1C LEVEL LT 7.0%: CPT | Mod: CPTII,,, | Performed by: FAMILY MEDICINE

## 2024-06-07 PROCEDURE — 3008F BODY MASS INDEX DOCD: CPT | Mod: CPTII,,, | Performed by: FAMILY MEDICINE

## 2024-06-07 PROCEDURE — 4010F ACE/ARB THERAPY RXD/TAKEN: CPT | Mod: CPTII,,, | Performed by: FAMILY MEDICINE

## 2024-06-07 PROCEDURE — 3079F DIAST BP 80-89 MM HG: CPT | Mod: CPTII,,, | Performed by: FAMILY MEDICINE

## 2024-06-07 NOTE — PROGRESS NOTES
"SUBJECTIVE:  Juanita Rosa is a 53 y.o. male here for Diabetes (With labs )      HPI  Patient here for follow-up on chronic conditions.  See assessment and plan for individual list of issues.  Since his last visit with me he has been diagnosed with what they suspect his trigeminal neuralgia.  He is having severe pain in the right temporal area.  It was got to the point he can not even go to work.  They just started him on carbamazepine.  He is having to take narcotics at night just asleep.  Bruces allergies, medications, history, and problem list were updated as appropriate.    Review of Systems   See HPI    Recent Results (from the past 504 hour(s))   Comprehensive Metabolic Panel    Collection Time: 05/30/24  9:45 AM   Result Value Ref Range    Glucose 99 70 - 99 mg/dL    BUN 20 6 - 24 mg/dL    Creatinine 0.98 0.76 - 1.27 mg/dL    eGFR 92 >59 mL/min/1.73    BUN/Creatinine Ratio 20 9 - 20    Sodium 139 134 - 144 mmol/L    Potassium 4.4 3.5 - 5.2 mmol/L    Chloride 103 96 - 106 mmol/L    CO2 22 20 - 29 mmol/L    Calcium 10.1 8.7 - 10.2 mg/dL    Protein, Total 6.8 6.0 - 8.5 g/dL    Albumin 4.5 3.8 - 4.9 g/dL    Globulin, Total 2.3 1.5 - 4.5 g/dL    Albumin/Globulin Ratio 2.0 1.2 - 2.2    Total Bilirubin 0.5 0.0 - 1.2 mg/dL    Alkaline Phosphatase 71 44 - 121 IU/L    AST 17 0 - 40 IU/L    ALT 21 0 - 44 IU/L   Hemoglobin A1C    Collection Time: 05/30/24  9:45 AM   Result Value Ref Range    Hemoglobin A1c 5.5 4.8 - 5.6 %       OBJECTIVE:  Vital signs  Vitals:    06/07/24 0829   BP: 122/82   BP Location: Right arm   Pulse: 91   Temp: 97.4 °F (36.3 °C)   TempSrc: Oral   SpO2: 98%   Weight: 92.5 kg (204 lb)   Height: 5' 10" (1.778 m)        Physical Exam non ill-appearing, heart with a regular rate and rhythm, he does appear to be in some discomfort    ASSESSMENT/PLAN:  1. Primary hypertension  Controlled on lisinopril    2. Hyperlipidemia, unspecified hyperlipidemia type  Rosuvastatin  Overview:  Lab Results   Component " Value Date    CHOL 140 11/15/2023    HDL 35 (L) 11/15/2023    TRIG 138 11/15/2023         3. Type 2 diabetes mellitus without complication, with long-term current use of insulin  Increase tirzepatide to 10 mg weekly and decrease Lantus to 20 units daily  Overview:  Lab Results   Component Value Date    HGBA1C 5.5 05/30/2024    HGBA1C 7.1 (H) 11/15/2023    HGBA1C 7.5 (H) 05/04/2023        Orders:  -     tirzepatide 10 mg/0.5 mL PnIj; Inject 10 mg into the skin every 7 days.  Dispense: 12 Pen; Refill: 1     4. Trigeminal neuralgia - he has been on carbamazepine 100 mg twice a day for a week and I told him he can go ahead and increase to 200 mg twice a day    Follow Up:  Follow up in about 3 months (around 9/7/2024) for recheck, fasting labs.

## 2024-06-07 NOTE — TELEPHONE ENCOUNTER
----- Message from Antonella Infante sent at 6/7/2024 11:56 AM CDT -----  Contact: Griselda/wife  Griselda called in regarding medications prescribed are not helping. He was prescribed a muscle relaxer and and anti seizure medication. Patient went to primary care today who suggested increasing anti seizure medication. She wanted to know if this will be approved. Please call back at [phone].    Thanks

## 2024-06-07 NOTE — TELEPHONE ENCOUNTER
Pt called in he his having bad pain in his ear . He wants to know if the medication prescribed tegretol (can be increased  ) ?

## 2024-06-10 ENCOUNTER — TELEPHONE (OUTPATIENT)
Dept: NEUROLOGY | Facility: CLINIC | Age: 54
End: 2024-06-10
Payer: COMMERCIAL

## 2024-06-10 DIAGNOSIS — Z79.4 TYPE 2 DIABETES MELLITUS WITHOUT COMPLICATION, WITH LONG-TERM CURRENT USE OF INSULIN: ICD-10-CM

## 2024-06-10 DIAGNOSIS — E11.9 TYPE 2 DIABETES MELLITUS WITHOUT COMPLICATION, WITH LONG-TERM CURRENT USE OF INSULIN: ICD-10-CM

## 2024-06-10 RX ORDER — BACLOFEN 20 MG/1
20 TABLET ORAL 2 TIMES DAILY
Qty: 180 TABLET | Refills: 3 | Status: SHIPPED | OUTPATIENT
Start: 2024-06-10 | End: 2025-06-10

## 2024-06-10 RX ORDER — CARBAMAZEPINE 400 MG/1
400 TABLET, EXTENDED RELEASE ORAL 2 TIMES DAILY
Qty: 180 TABLET | Refills: 3 | Status: SHIPPED | OUTPATIENT
Start: 2024-06-10 | End: 2025-06-10

## 2024-06-10 NOTE — TELEPHONE ENCOUNTER
----- Message from Hailey Shipley sent at 6/10/2024 10:10 AM CDT -----  Contact: [phone]  [email or web address]     Patient is calling for Medical Advice regarding:pt still in a lot of pain     How long has patient had these symptoms:    Pharmacy name and phone#:    Would like response via Cortexat: no     Comments:  Pts wife is calling she states the pt was seen on 05/31 and was given medication for the pain she states he is still in pain and is asking what else they can do     pt is experiencing pain on right side of his face.  MRI was done but it didnt show anything

## 2024-06-10 NOTE — TELEPHONE ENCOUNTER
Pt has been taken 200 mg Bid since Friday  , he stated it is not helping . His PCP prescribed him Narco 5-325mg  he stated that is not even touching the paint .  Pain is at a 10. Mornings are good pain starts  around 10 am  and last pt OTC  aleve as well .

## 2024-06-11 ENCOUNTER — HOSPITAL ENCOUNTER (OUTPATIENT)
Dept: RADIOLOGY | Facility: HOSPITAL | Age: 54
Discharge: HOME | End: 2024-06-11
Attending: PSYCHIATRY & NEUROLOGY
Payer: COMMERCIAL

## 2024-06-11 DIAGNOSIS — G50.0 RIGHT TRIGEMINAL NEURALGIA: ICD-10-CM

## 2024-06-11 PROCEDURE — A9577 INJ MULTIHANCE: HCPCS | Performed by: PSYCHIATRY & NEUROLOGY

## 2024-06-11 PROCEDURE — 25500020 PHARM REV CODE 255: Performed by: PSYCHIATRY & NEUROLOGY

## 2024-06-11 PROCEDURE — 70553 MRI BRAIN STEM W/O & W/DYE: CPT | Mod: TC

## 2024-06-11 RX ADMIN — GADOBENATE DIMEGLUMINE 20 ML: 529 INJECTION, SOLUTION INTRAVENOUS at 05:06

## 2024-06-12 ENCOUNTER — PATIENT MESSAGE (OUTPATIENT)
Dept: NEUROLOGY | Facility: CLINIC | Age: 54
End: 2024-06-12
Payer: COMMERCIAL

## 2024-06-25 ENCOUNTER — TELEPHONE (OUTPATIENT)
Dept: NEUROLOGY | Facility: CLINIC | Age: 54
End: 2024-06-25

## 2024-06-25 ENCOUNTER — OFFICE VISIT (OUTPATIENT)
Dept: NEUROLOGY | Facility: CLINIC | Age: 54
End: 2024-06-25
Payer: COMMERCIAL

## 2024-06-25 VITALS
WEIGHT: 197.06 LBS | HEART RATE: 114 BPM | SYSTOLIC BLOOD PRESSURE: 110 MMHG | HEIGHT: 70 IN | RESPIRATION RATE: 16 BRPM | BODY MASS INDEX: 28.21 KG/M2 | DIASTOLIC BLOOD PRESSURE: 79 MMHG | OXYGEN SATURATION: 99 %

## 2024-06-25 DIAGNOSIS — E78.5 HYPERLIPIDEMIA, UNSPECIFIED HYPERLIPIDEMIA TYPE: ICD-10-CM

## 2024-06-25 DIAGNOSIS — F32.A DEPRESSION, UNSPECIFIED DEPRESSION TYPE: ICD-10-CM

## 2024-06-25 DIAGNOSIS — B02.29 POSTHERPETIC NEURALGIA: ICD-10-CM

## 2024-06-25 DIAGNOSIS — E11.9 TYPE 2 DIABETES MELLITUS WITHOUT COMPLICATION, WITH LONG-TERM CURRENT USE OF INSULIN: ICD-10-CM

## 2024-06-25 DIAGNOSIS — Z86.010 HISTORY OF COLONIC POLYPS: ICD-10-CM

## 2024-06-25 DIAGNOSIS — N40.0 BENIGN PROSTATIC HYPERPLASIA, UNSPECIFIED WHETHER LOWER URINARY TRACT SYMPTOMS PRESENT: ICD-10-CM

## 2024-06-25 DIAGNOSIS — G50.0 TRIGEMINAL NEURALGIA: Primary | ICD-10-CM

## 2024-06-25 DIAGNOSIS — M51.34 DEGENERATION OF INTERVERTEBRAL DISC OF THORACIC REGION: ICD-10-CM

## 2024-06-25 DIAGNOSIS — I10 PRIMARY HYPERTENSION: ICD-10-CM

## 2024-06-25 DIAGNOSIS — Z79.4 TYPE 2 DIABETES MELLITUS WITHOUT COMPLICATION, WITH LONG-TERM CURRENT USE OF INSULIN: ICD-10-CM

## 2024-06-25 PROCEDURE — 99999 PR PBB SHADOW E&M-EST. PATIENT-LVL V: CPT | Mod: PBBFAC,,, | Performed by: PSYCHIATRY & NEUROLOGY

## 2024-06-25 RX ORDER — LAMOTRIGINE 25 MG/1
25 TABLET ORAL 2 TIMES DAILY
Qty: 60 TABLET | Refills: 5 | Status: SHIPPED | OUTPATIENT
Start: 2024-06-25

## 2024-06-25 RX ORDER — PREGABALIN 50 MG/1
50 CAPSULE ORAL 3 TIMES DAILY
Qty: 90 CAPSULE | Refills: 5 | Status: SHIPPED | OUTPATIENT
Start: 2024-06-25

## 2024-06-25 NOTE — TELEPHONE ENCOUNTER
Good Afternoon.     put in a referral for patient to see Dr.Roger Bee can someone please reach out to patient in regards to scheduling appt. Patient is needing to be seen as soon as possible.       Thank you in advance

## 2024-06-25 NOTE — PROGRESS NOTES
Subjective:       Patient ID: Juanita Rosa is a 53 y.o. male.    Chief Complaint:  Right trigeminal neuralgia          HPI      BACKGROUND HISTORY      The patient presented on 05-, accompanied by his wife, for evaluation of RT facial pain.     The patient started having pain in the face in 2023. The pain is located in the jaw and around the ear.He was initially evaluated by dentist and underwent root canal and extraction followed by ENT evaluation with no relief.  Of note, the patient did experience similar problem in the early 2000's. He also suffers from recurrent RT V3 Zoster (Shingles). The patient describes the pain as very intense 10/10 throbbing pain lasting for few seconds on on/off basis with no loss of vision, hearing or balance. Denied weakness or numbness. Has been taking Ibuprofen and Percocet ATC with no improvement.  I ordered Brain MRI and started CBZ XR titration with Baclofen titration.      INTERVAL HISTORY         The patient presented on 06-25--2024, accompanied by his wife, for follow up evaluation of RT facial pain.       On 06- Brain MRI [facility]. CBZ and Baclofen have failed to alleviate his pain even at high doses.        Review of Systems   Constitutional:  Negative for appetite change and fatigue.   HENT:  Negative for hearing loss and tinnitus.    Eyes:  Negative for photophobia and visual disturbance.   Respiratory:  Negative for apnea and shortness of breath.    Cardiovascular:  Negative for chest pain and palpitations.   Gastrointestinal:  Negative for nausea and vomiting.   Endocrine: Negative for cold intolerance and heat intolerance.   Genitourinary:  Negative for difficulty urinating and urgency.   Musculoskeletal:  Negative for arthralgias, back pain, gait problem, joint swelling, myalgias, neck pain and neck stiffness.   Skin:  Negative for color change and rash.   Allergic/Immunologic: Negative for environmental allergies and immunocompromised state.  "  Neurological:  Negative for dizziness, tremors, seizures, syncope, facial asymmetry, speech difficulty, weakness, light-headedness, numbness and headaches.        RT Facial Pain   Hematological:  Negative for adenopathy. Does not bruise/bleed easily.   Psychiatric/Behavioral:  Negative for agitation, behavioral problems, confusion, decreased concentration, dysphoric mood, hallucinations, self-injury, sleep disturbance and suicidal ideas. The patient is not hyperactive.                Current Outpatient Medications:     baclofen (LIORESAL) 20 MG tablet, Take 1 tablet (20 mg total) by mouth 2 (two) times daily., Disp: 180 tablet, Rfl: 3    carBAMazepine (TEGRETOL XR) 400 MG Tb12, Take 1 tablet (400 mg total) by mouth 2 (two) times daily., Disp: 180 tablet, Rfl: 3    fluocinolone acetonide oiL 0.01 % Drop, 20 mLs., Disp: , Rfl:     FLUoxetine 20 MG capsule, TAKE 1 CAPSULE ONCE DAILY, Disp: 90 capsule, Rfl: 1     mg tablet, Take 600 mg by mouth every 6 (six) hours., Disp: , Rfl:     insulin degludec (TRESIBA FLEXTOUCH U-200) 200 unit/mL (3 mL) insulin pen, Inject 50 Units into the skin once daily., Disp: 9 pen , Rfl: 2    ipratropium (ATROVENT) 42 mcg (0.06 %) nasal spray, 1 spray by Each Nostril route 2 (two) times daily., Disp: , Rfl:     lisinopriL 10 MG tablet, TAKE 1 TABLET DAILY, Disp: 90 tablet, Rfl: 1    metFORMIN (GLUCOPHAGE) 1000 MG tablet, TAKE 1 TABLET TWICE A DAY, Disp: 180 tablet, Rfl: 3    mometasone 0.1% (ELOCON) 0.1 % cream, Apply 15 g topically daily as needed., Disp: , Rfl:     pen needle, diabetic (SURE COMFORT PEN NEEDLE) 31 gauge x 3/16" Ndle, USE ONCE DAILY WITH INSULIN, Disp: 100 each, Rfl: 11    phentermine (ADIPEX-P) 37.5 mg tablet, Take 1 tablet (37.5 mg total) by mouth once daily., Disp: 30 tablet, Rfl: 2    PROLENSA 0.07 % Drop, Apply 1 drop to eye Daily., Disp: , Rfl:     rosuvastatin (CRESTOR) 10 MG tablet, TAKE 1 TABLET ONCE DAILY, Disp: 90 tablet, Rfl: 3    tadalafiL (CIALIS) 5 " MG tablet, Take 4 tablets (20 mg total) by mouth once daily., Disp: 360 tablet, Rfl: 3    tirzepatide 10 mg/0.5 mL PnIj, Inject 10 mg into the skin every 7 days., Disp: 6 mL, Rfl: 1    tamsulosin (FLOMAX) 0.4 mg Cap, Take 1 capsule (0.4 mg total) by mouth once daily., Disp: 90 capsule, Rfl: 1    Past Medical History:   Diagnosis Date    BPH (benign prostatic hyperplasia)     Depression     Erectile dysfunction     History of colon polyps     History of pneumothorax     Hyperlipidemia     Hyperlipidemia     Hypertension     Type II diabetes mellitus        Past Surgical History:   Procedure Laterality Date    ANTERIOR CERVICAL DISCECTOMY W/ FUSION      CARPAL TUNNEL RELEASE Left     CHEST TUBE INSERTION      COLONOSCOPY      EXCISIONAL HEMORRHOIDECTOMY      LEFT HEART CATHETERIZATION      SHOULDER SURGERY Left     THORACIC DISCECTOMY      VASECTOMY         Social History     Socioeconomic History    Marital status:    Tobacco Use    Smoking status: Former     Current packs/day: 0.00     Average packs/day: 1 pack/day for 15.6 years (15.6 ttl pk-yrs)     Types: Cigarettes     Start date: 1983     Quit date: 10/8/1999     Years since quittin.7    Smokeless tobacco: Never   Substance and Sexual Activity    Alcohol use: Yes     Comment: social    Drug use: Never    Sexual activity: Yes     Partners: Female     Social Determinants of Health     Financial Resource Strain: Low Risk  (2024)    Overall Financial Resource Strain (CARDIA)     Difficulty of Paying Living Expenses: Not hard at all   Food Insecurity: No Food Insecurity (2024)    Hunger Vital Sign     Worried About Running Out of Food in the Last Year: Never true     Ran Out of Food in the Last Year: Never true   Transportation Needs: No Transportation Needs (3/12/2024)    PRAPARE - Transportation     Lack of Transportation (Medical): No     Lack of Transportation (Non-Medical): No   Physical Activity: Insufficiently Active (2024)     Exercise Vital Sign     Days of Exercise per Week: 2 days     Minutes of Exercise per Session: 60 min   Stress: No Stress Concern Present (6/4/2024)    [employer] [place] [employer] - Occupational Stress Questionnaire     Feeling of Stress : Not at all   Housing Stability: Unknown (3/12/2024)    Housing Stability Vital Sign     Unable to Pay for Housing in the Last Year: No     Number of Places Lived in the Last Year: 1             Past/Current Medical/Surgical History, Past/Current Social History, Past/Current Family History and Past/Current Medications were reviewed in detail.        Objective:           VITAL SIGNS WERE REVIEWED      GENERAL APPEARANCE:     The patient looks uncomfortable.    BMI 28.28    No signs of respiratory distress.    Normal breathing pattern.    No dysmorphic features    Normal eye contact.       GENERAL MEDICAL EXAM:    HEENT:  Head is atraumatic normocephalic.     FUNDUSCOPIC (OPHTHALMOSCOPIC) EXAMINATION showed no disc edema (papilledema).      NECK: No JVD. No visible lesions or goiters.     CHEST-CARDIOPULMONARY: No cyanosis. No tachypnea. Normal respiratory effort.    [id number]: No jaundice. No stomas or lesions. No visible hernias. No catheters.     SKIN, HAIR, NAILS: No pathognomonic skin rash.No neurofibromatosis. No visible lesions.No stigmata of autoimmune disease. No clubbing.    LIMBS: No varicose veins. No visible swelling.    MUSCULOSKELETAL: No visible deformities.No visible lesions.             Neurologic Exam     Mental Status   Oriented to person, place, and time.   Follows 3 step commands.   Attention: normal. Concentration: normal.   Speech: speech is normal   Level of consciousness: alert  Able to name object. Able to repeat. Normal comprehension.     Cranial Nerves   Cranial nerves II through XII intact.     CN II   Visual fields full to confrontation.   Visual acuity: normal  Right visual field deficit: none  Left visual field  deficit: none     CN III, IV, VI   Pupils are equal, round, and reactive to light.  Extraocular motions are normal.   Right pupil: Size: 2 mm. Shape: regular. Reactivity: brisk. Consensual response: intact. Accommodation: intact.   Left pupil: Size: 2 mm. Shape: regular. Reactivity: brisk. Consensual response: intact. Accommodation: intact.   CN III: no CN III palsy  CN VI: no CN VI palsy  Nystagmus: none   Diplopia: none  Ophthalmoparesis: none  Upgaze: normal  Downgaze: normal  Conjugate gaze: present  Vestibulo-ocular reflex: present    CN V   Facial sensation intact.   Right facial sensation deficit: none  Left facial sensation deficit: none  Jaw jerk: normal    CN VII   Facial expression full, symmetric.   Right facial weakness: none  Left facial weakness: none    CN VIII   CN VIII normal.   Hearing: intact    CN IX, X   CN IX normal.   CN X normal.   Palate: symmetric    CN XI   CN XI normal.   Right sternocleidomastoid strength: normal  Left sternocleidomastoid strength: normal  Right trapezius strength: normal  Left trapezius strength: normal    CN XII   CN XII normal.   Tongue: not atrophic  Fasciculations: absent  Tongue deviation: none    Motor Exam   Muscle bulk: normal  Overall muscle tone: normal  Right arm tone: normal  Left arm tone: normal  Right arm pronator drift: absent  Left arm pronator drift: absent  Right leg tone: normal  Left leg tone: normal    Strength   Strength 5/5 throughout.   Right neck flexion: 5/5  Left neck flexion: 5/5  Right neck extension: 5/5  Left neck extension: 5/5  Right deltoid: 5/5  Left deltoid: 5/5  Right biceps: 5/5  Left biceps: 5/5  Right triceps: 5/5  Left triceps: 5/5  Right wrist flexion: 5/5  Left wrist flexion: 5/5  Right wrist extension: 5/5  Left wrist extension: 5/5  Right interossei: 5/5  Left interossei: 5/5  Right iliopsoas: 5/5  Left iliopsoas: 5/5  Right quadriceps: 5/5  Left quadriceps: 5/5  Right hamstrin/5  Left hamstrin/5  Right glutei:  5/5  Left glutei: 5/5  Right anterior tibial: 5/5  Left anterior tibial: 5/5  Right posterior tibial: 5/5  Left posterior tibial: 5/5  Right peroneal: 5/5  Left peroneal: 5/5  Right gastroc: 5/5  Left gastroc: 5/5    Sensory Exam   Light touch normal.   Right arm light touch: normal  Left arm light touch: normal  Right leg light touch: normal  Left leg light touch: normal  Vibration normal.   Right arm vibration: normal  Left arm vibration: normal  Right leg vibration: normal  Left leg vibration: normal  Proprioception normal.   Right arm proprioception: normal  Left arm proprioception: normal  Right leg proprioception: normal  Left leg proprioception: normal  Pinprick normal.   Right arm pinprick: normal  Left arm pinprick: normal  Right leg pinprick: normal  Left leg pinprick: normal  Graphesthesia: normal  Stereognosis: normal    Gait, Coordination, and Reflexes     Gait  Gait: normal    Coordination   Romberg: negative  Finger to nose coordination: normal  Heel to shin coordination: normal  Tandem walking coordination: normal    Tremor   Resting tremor: absent  Intention tremor: absent  Action tremor: absent    Reflexes   Right brachioradialis: 2+  Left brachioradialis: 2+  Right biceps: 2+  Left biceps: 2+  Right triceps: 2+  Left triceps: 2+  Right patellar: 2+  Left patellar: 2+  Right achilles: 2+  Left achilles: 2+  Right plantar: normal  Left plantar: normal  Right Carrillo: absent  Left Carrillo: absent  Right ankle clonus: absent  Left ankle clonus: absent  Right pendular knee jerk: absent  Left pendular knee jerk: absent      Lab Results   Component Value Date    WBC 7.01 03/13/2024    HGB 14.7 03/13/2024    HCT 43.2 03/13/2024    MCV 86.6 03/13/2024     03/13/2024       Sodium   Date Value Ref Range Status   05/30/2024 139 134 - 144 mmol/L Final     Potassium   Date Value Ref Range Status   05/30/2024 4.4 3.5 - 5.2 mmol/L Final     Chloride   Date Value Ref Range Status   05/30/2024 103 96 - 106  mmol/L Final     CO2   Date Value Ref Range Status   05/30/2024 22 20 - 29 mmol/L Final     Glucose   Date Value Ref Range Status   05/30/2024 99 70 - 99 mg/dL Final     BUN   Date Value Ref Range Status   05/30/2024 20 6 - 24 mg/dL Final     Creatinine   Date Value Ref Range Status   05/30/2024 0.98 0.76 - 1.27 mg/dL Final     Calcium   Date Value Ref Range Status   05/30/2024 10.1 8.7 - 10.2 mg/dL Final     Albumin   Date Value Ref Range Status   05/30/2024 4.5 3.8 - 4.9 g/dL Final   03/13/2024 3.4 3.4 - 5.0 g/dL Final     Total Bilirubin   Date Value Ref Range Status   05/30/2024 0.5 0.0 - 1.2 mg/dL Final     ALP   Date Value Ref Range Status   03/13/2024 64 50 - 144 unit/L Final     AST   Date Value Ref Range Status   05/30/2024 17 0 - 40 IU/L Final     ALT   Date Value Ref Range Status   05/30/2024 21 0 - 44 IU/L Final     Estimated GFR-Non    Date Value Ref Range Status   09/10/2021 90 >>59 mL/min/1.73 m2 Final       Lab Results   Component Value Date    MSVCUKFD19 442 11/15/2023       Lab Results   Component Value Date    TSH 0.819 05/04/2023         LABORATORY EVALUATION      [id number]    CBC, CMP, TFT, B12 Unremarkable HAC 5.5-8.1    RADIOLOGY EVALUATION       05-    Brain MRI [facility].        NEUROPHYSIOLOGY EVALUATION       PATHOLOGY EVALUATION        NEUROCOGNITIVE AND NEUROPSYCHOLOGY EVALUATION           Reviewed the neuroimaging independently       Assessment:           1. Trigeminal neuralgia    2. Benign prostatic hyperplasia, unspecified whether lower urinary tract symptoms present    3. 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 Type 2 diabetes mellitus without complication, with long-term current use of insulin    5. History of colonic polyps    6. Hyperlipidemia, unspecified hyperlipidemia type    7. Primary hypertension    8. Depression, unspecified depression type    9. Postherpetic neuralgia          Plan:             TRIGEMINAL NEURALGIA (TN), RIGHT  SIDE, V3 BRANCH, ONSET 2023, EPISODIC, SEVERE    POSSIBLE POSTHERPETIC NEURALGIA (PHN )        MEDICAL MANAGEMENT       Try LTG 25 mg BID (Maxim 200 mg BID). Warned the patient about the 3% risk of SJS and recent reports of cardiac arrhythmias.     Try PGB 50 mg TID (Maximum 200 mg TID). Watch for sedation, edema, weigh gain and abnormal movements.      CBZ failed.    Baclofen failed.         NEUROSURGERY EVALUATION FOR INVASIVE TREATMENT         Microvascular decompression has the best outcome for trigeminal neuralgia with about 90% control at 5 years.  This does require craniotomy however with its attendant risks.      Stereotactic radiosurgery is generally about 70% effective at 5 years and generally provides more relief when there is some sensory loss.    Percutaneous glycerol ganglionotomy is easily administered as an outpatient with 60-80% efficacy but pain returns in about 50% of patients with in 5 years.    Gamma Knife is about 60-80% effective but pain returns in about 50% of patients with in 5 years.        MEDICAL/SURGICAL COMORBIDITIES     All relevant medical comorbidities noted and managed by primary care physician and medical care team.          HEALTHY LIFESTYLE AND PREVENTATIVE CARE    The patient to adhere to the age-appropriate health maintenance guidelines including screening tests and vaccinations. The patient to adhere to  healthy lifestyle, optimal weight, exercise, healthy diet, good sleep hygiene and avoiding drugs including smoking, alcohol and recreational drugs.          Jenna Roman MD, FAAN    Attending Neurologist/Epileptologist         Diplomate, American Board of Psychiatry and Neurology    Diplomate, American Board of Clinical Neurophysiology     Fellow, American Academy of Neurology         I spent a total of 42 minutes on the day of the visit.  This includes face to face time and non-face to face time preparing to see the patient (eg, review of tests), obtaining and/or reviewing  separately obtained history, documenting clinical information in the electronic or other health record, independently interpreting results and communicating results to the patient/family/caregiver, or care coordinator.

## 2024-06-25 NOTE — LETTER
June 25, 2024    Juanita Rosa  [address]  [place] [address]             [facility]  [address] [place] [address]  Phone: [phone]  Fax: [phone] To Whom It May Concern,    Mr. Rosa has severe and intractable trigeminal neuralgia (a very painful condition). The pain medications prescribed for him caused sleepiness and drowsiness. He is also in the process of undergoing surgical intervention.     If you have any questions or concerns, please don't hesitate to call.    Sincerely,        Jenna Roman MD, FAAN

## 2024-06-26 ENCOUNTER — TELEPHONE (OUTPATIENT)
Dept: NEUROLOGY | Facility: CLINIC | Age: 54
End: 2024-06-26
Payer: COMMERCIAL

## 2024-06-26 DIAGNOSIS — G50.0 TRIGEMINAL NEURALGIA: Primary | ICD-10-CM

## 2024-06-26 NOTE — TELEPHONE ENCOUNTER
----- Message from Lester Pemberton sent at 6/26/2024  3:50 PM CDT -----  Contact: Griselda  Type:  Patient Requesting Referral    Who Called:Griselda  Does the patient already have the specialty appointment scheduled?:no  If yes, what is the date of that appointment?:unknown  Referral to What Specialty: Neuro surgeon  Reason for Referral:Required  Does the patient want the referral with a specific physician?:Alex Salgado ([phone]) fax is [phone]  [facility]  Patient Requesting a Response?:yes  Would the patient rather a call back or a response via [facility]? Call back  Best Call Back Number:[phone]  Additional Information:

## 2024-06-26 NOTE — TELEPHONE ENCOUNTER
----- Message from Naida Yarbrough, Patient Care Assistant sent at 6/26/2024 12:33 PM CDT -----  Contact: Pt Wife  Type: Needs Medical Advice  Who Called: Pt Wife  Best Call Back Number: [phone] (home)   Inquiry/Question: Wife is calling to provide name and number for referral [facility] Phone: [phone] Fax: [phone], [facility] Phone: [phone] Fax: [phone] Pt is requesting referral to be sent to both facilities.  Please advise Thank you~

## 2024-06-27 ENCOUNTER — TELEPHONE (OUTPATIENT)
Dept: NEUROLOGY | Facility: CLINIC | Age: 54
End: 2024-06-27
Payer: COMMERCIAL

## 2024-06-27 NOTE — TELEPHONE ENCOUNTER
----- Message from Diana Grey sent at 6/27/2024  9:06 AM CDT -----  Contact: pt's wife/Griselda Villalobos is calling in rgd to needing the nurse to call her about the message she requested referrals to be sent out on yesterday.  Please call her back at [phone]  thanks/rio

## 2024-06-27 NOTE — TELEPHONE ENCOUNTER
Wife called for status of  referral advised her that they were faxed to  all numbers urgent . She verbalized understanding ,.

## 2024-06-28 ENCOUNTER — PATIENT MESSAGE (OUTPATIENT)
Dept: FAMILY MEDICINE | Facility: CLINIC | Age: 54
End: 2024-06-28
Payer: COMMERCIAL

## 2024-06-28 DIAGNOSIS — Z79.4 TYPE 2 DIABETES MELLITUS WITHOUT COMPLICATION, WITH LONG-TERM CURRENT USE OF INSULIN: ICD-10-CM

## 2024-06-28 DIAGNOSIS — E11.9 TYPE 2 DIABETES MELLITUS WITHOUT COMPLICATION, WITH LONG-TERM CURRENT USE OF INSULIN: ICD-10-CM

## 2024-07-03 ENCOUNTER — OFFICE VISIT (OUTPATIENT)
Dept: NEUROSURGERY | Facility: CLINIC | Age: 54
End: 2024-07-03
Payer: COMMERCIAL

## 2024-07-03 ENCOUNTER — TELEPHONE (OUTPATIENT)
Dept: FAMILY MEDICINE | Facility: CLINIC | Age: 54
End: 2024-07-03
Payer: COMMERCIAL

## 2024-07-03 VITALS
DIASTOLIC BLOOD PRESSURE: 73 MMHG | SYSTOLIC BLOOD PRESSURE: 104 MMHG | BODY MASS INDEX: 28.63 KG/M2 | HEIGHT: 70 IN | HEART RATE: 106 BPM | WEIGHT: 200 LBS | RESPIRATION RATE: 16 BRPM

## 2024-07-03 DIAGNOSIS — B02.9 HERPES ZOSTER WITHOUT COMPLICATION: Primary | ICD-10-CM

## 2024-07-03 DIAGNOSIS — N40.0 BENIGN PROSTATIC HYPERPLASIA, UNSPECIFIED WHETHER LOWER URINARY TRACT SYMPTOMS PRESENT: Primary | ICD-10-CM

## 2024-07-03 DIAGNOSIS — E11.9 TYPE 2 DIABETES MELLITUS WITHOUT COMPLICATION, WITH LONG-TERM CURRENT USE OF INSULIN: ICD-10-CM

## 2024-07-03 DIAGNOSIS — I10 HYPERTENSION, UNSPECIFIED TYPE: ICD-10-CM

## 2024-07-03 DIAGNOSIS — G50.0 TRIGEMINAL NEURALGIA: ICD-10-CM

## 2024-07-03 DIAGNOSIS — Z79.4 TYPE 2 DIABETES MELLITUS WITHOUT COMPLICATION, WITH LONG-TERM CURRENT USE OF INSULIN: ICD-10-CM

## 2024-07-03 PROCEDURE — 99205 OFFICE O/P NEW HI 60 MIN: CPT | Mod: ,,, | Performed by: PHYSICIAN ASSISTANT

## 2024-07-03 PROCEDURE — 1159F MED LIST DOCD IN RCRD: CPT | Mod: CPTII,,, | Performed by: PHYSICIAN ASSISTANT

## 2024-07-03 PROCEDURE — 3044F HG A1C LEVEL LT 7.0%: CPT | Mod: CPTII,,, | Performed by: PHYSICIAN ASSISTANT

## 2024-07-03 PROCEDURE — 3078F DIAST BP <80 MM HG: CPT | Mod: CPTII,,, | Performed by: PHYSICIAN ASSISTANT

## 2024-07-03 PROCEDURE — 4010F ACE/ARB THERAPY RXD/TAKEN: CPT | Mod: CPTII,,, | Performed by: PHYSICIAN ASSISTANT

## 2024-07-03 PROCEDURE — 1160F RVW MEDS BY RX/DR IN RCRD: CPT | Mod: CPTII,,, | Performed by: PHYSICIAN ASSISTANT

## 2024-07-03 PROCEDURE — 3008F BODY MASS INDEX DOCD: CPT | Mod: CPTII,,, | Performed by: PHYSICIAN ASSISTANT

## 2024-07-03 PROCEDURE — 3074F SYST BP LT 130 MM HG: CPT | Mod: CPTII,,, | Performed by: PHYSICIAN ASSISTANT

## 2024-07-03 RX ORDER — LISINOPRIL 10 MG/1
10 TABLET ORAL
Qty: 90 TABLET | Refills: 1 | Status: SHIPPED | OUTPATIENT
Start: 2024-07-03

## 2024-07-03 RX ORDER — BACLOFEN 20 MG/1
20 TABLET ORAL
COMMUNITY

## 2024-07-03 RX ORDER — ACYCLOVIR 800 MG/1
800 TABLET ORAL
Qty: 35 TABLET | Refills: 0 | Status: SHIPPED | OUTPATIENT
Start: 2024-07-03 | End: 2025-07-03

## 2024-07-03 RX ORDER — BACLOFEN 10 MG/1
10 TABLET ORAL
COMMUNITY

## 2024-07-03 RX ORDER — EMPAGLIFLOZIN 25 MG/1
25 TABLET, FILM COATED ORAL
Qty: 90 TABLET | Refills: 1 | Status: SHIPPED | OUTPATIENT
Start: 2024-07-03

## 2024-07-03 RX ORDER — TAMSULOSIN HYDROCHLORIDE 0.4 MG/1
1 CAPSULE ORAL
Qty: 90 CAPSULE | Refills: 1 | Status: SHIPPED | OUTPATIENT
Start: 2024-07-03

## 2024-07-03 NOTE — PROGRESS NOTES
[facility]  History & Physical  Neurosurgery      Juanita Rosa   [id number]   1970     SUBJECTIVE:     Chief Complaint:  Right ear pain      History of Present Illness:   Patient is a 53 y.o. male is seen today for for neurosurgical evaluation and care.  The patient has been referred to Dr. Salgado by Dr. Jenna Roman, neurologist, in regards to trigeminal neuralgia.  The patient began with facial pain in 2023.  He was seen by a dentist and underwent root canal followed by extraction.  He did not experience improvement of his pain.  He was also seen and evaluated by ENT.  It was also noted by the neurologist that he suffers from recurrent right V3 shingles.  The patient reports that he experiences a flare up of the shingles typically once a year.  When it flares up, he develops significant swelling and bumps as well as tenderness at the right ear.  The symptoms improve after he was treated for the shingles.  Carbamazepine and baclofen were initiated when he was seen on 06/25/2024 by Dr. Roman.    The patient reports that 2 years ago he began to have a tooth problem along the right mandible.  Thereafter, he had root canal performed.  He continued with pain at the tooth.  He was seen by his primary care physician Dr. Monae.  He then returned to the endodontist.  He was found to have an infection in the tooth and the tooth was pulled at the end of April 2024.  Once the tooth was pulled, he began with severe pain in the right ear.  Pain was there prior to this time, but not as severe.  He complains of pain at the right ear.  At times he has pain up into the right temporal and frontal region.  He feels pressure of the right ear upon wakening with burning pain.  Once he was up, the pain worsens through the day, throbbing in nature.  At around 3 or 4 in the afternoon, he feels in ice pick type pain at the right ear that last 2 to 10 minutes varying in length of time.  He was unable to lie on the right side  of his head due to increased pain.  He has to take pain medication to be able to sleep at night.  He was eating a soft diet in an effort to prevent worsening of pain.  He does not have particular triggers with the exception of the when from the ceiling fan while sleeping increases throbbing and burning pain.        Past Medical History:   Diagnosis Date    BPH (benign prostatic hyperplasia)     Depression     Erectile dysfunction     History of colon polyps     History of pneumothorax     Hyperlipidemia     Hypertension     Personal history of colonic polyps 2024    Trigeminal neuralgia     Type II diabetes mellitus         Past Surgical History:   Procedure Laterality Date    ANTERIOR CERVICAL DISCECTOMY W/ FUSION      CARPAL TUNNEL RELEASE Left     CHEST TUBE INSERTION      Colonoscoopy  2024    COLONOSCOPY      EXCISIONAL HEMORRHOIDECTOMY      LEFT HEART CATHETERIZATION      SHOULDER SURGERY Left     THORACIC DISCECTOMY      VASECTOMY          Social History     Tobacco Use    Smoking status: Former     Current packs/day: 0.00     Average packs/day: 1 pack/day for 15.6 years (15.6 ttl pk-yrs)     Types: Cigarettes     Start date: 1983     Quit date: 10/8/1999     Years since quittin.7    Smokeless tobacco: Never   Substance Use Topics    Alcohol use: Yes     Comment: social        Family History   Problem Relation Name Age of Onset    Heart disease Mother Imelda López     Diabetes Mother Imelda López     Heart disease Father Isidoro Carlos Rosa Jr     Diabetes Father Isidoro Gonzalez Shelley Dominguez         Review of patient's allergies indicates:   Allergen Reactions    Sulfa (sulfonamide antibiotics) Rash        Current Outpatient Medications   Medication Instructions    baclofen (LIORESAL) 10 mg, Oral, As needed (PRN)    baclofen (LIORESAL) 20 mg, Oral, As needed (PRN)    FLUoxetine 20 mg, Oral     mg, Oral, As needed (PRN)    insulin degludec (TRESIBA FLEXTOUCH U-200) 50 Units,  "Subcutaneous, Daily    JARDIANCE 25 mg, Oral    lamoTRIgine (LAMICTAL) 25 mg, Oral, 2 times daily    lisinopriL 10 mg, Oral    metFORMIN (GLUCOPHAGE) 1,000 mg, Oral, 2 times daily    pen needle, diabetic (SURE COMFORT PEN NEEDLE) 31 gauge x 3/16" Ndle USE ONCE DAILY WITH INSULIN    phentermine (ADIPEX-P) 37.5 mg, Oral, Daily    pregabalin (LYRICA) 50 mg, Oral, 3 times daily    PROLENSA 0.07 % Drop 1 drop, Ophthalmic, Daily    rosuvastatin (CRESTOR) 10 mg, Oral    tadalafiL (CIALIS) 20 mg, Oral, Daily    tamsulosin (FLOMAX) 0.4 mg, Oral    tirzepatide 10 mg, Subcutaneous, Every 7 days       Review of Systems   Constitutional:  Positive for activity change. Negative for chills and fever.   HENT:  Positive for ear pain. Negative for nosebleeds and sore throat.    Eyes:  Negative for pain and visual disturbance.   Respiratory:  Negative for cough, chest tightness and shortness of breath.    Cardiovascular:  Negative for chest pain.   Gastrointestinal:  Negative for diarrhea, nausea and vomiting.   Genitourinary:  Negative for difficulty urinating, dysuria and hematuria.   Musculoskeletal:  Negative for gait problem and myalgias.   Skin:  Negative for rash.   Neurological:  Positive for headaches. Negative for dizziness, facial asymmetry and numbness.   Psychiatric/Behavioral:  Negative for confusion and sleep disturbance. The patient is not nervous/anxious.        OBJECTIVE:       Visit Vitals  /73 (BP Location: Left arm, Patient Position: Sitting)   Pulse 106   Resp 16   Ht 5' 10" (1.778 m)   Wt 90.7 kg (200 lb)   BMI 28.70 kg/m²        General:  Pleasant, Well-nourished, Well-groomed.    Head:  Normocephalic, atraumatic.    CV:  Neck is supple.  There are no carotid bruits.  Heart has regular rate and rhythm.    Lungs:  Lungs are clear to auscultation bilaterally with non-labored respirations.    Abdomen:  Soft, nontender, nondistended.    Neurological:    Oriented to Person, Place, Time   Speech: " normal  Memory, cognition, and affect are appropriate.  Pupils are equal, round, and reactive to light.  Extraocular movements are intact.  Movements of facial expression are intact and symmetric.  Motor examination reveals 5/5 strength with normal tone and bulk.  Sensation is intact.  Gait is normal.      Imaging:  All pertinent neuroimaging independently reviewed. These findings were discussed in detail with the patient.   MRI of the brain with and without contrast was obtained on 06/11/2024.  By report, there was unremarkable evaluation of the brain and trigeminal nerves.      ASSESSMENT:     1. Herpes zoster without complication  acyclovir (ZOVIRAX) 800 MG Tab      2. Trigeminal neuralgia  Ambulatory referral/consult to Neurosurgery        PLAN:     Options were discussed at length with the patient.  I do not believe this is a case of trigeminal neuralgia.  His symptoms are quite complex.  The predicted facial pain diagnosis from the facial pain questionnaire was determined to be temporomandibular joint disorder.  While his symptoms seen more involved than TMJ pain, I think it was a factor.  Being is had history of zoster in the past in this area, he was provided a prescription for acyclovir.  I spoke to Dr. Salgado at the time of the appointment.  We will have the patient return to the office to see Dr. Salgado.  He will continue with baclofen and Lamictal as prescribed.      A total of 57 minutes was spent face-to-face with the patient during this encounter.  Over half of that time was spent on counseling and coordination of care.  Additional time was used to reviewed the patient's chart including notes from Dr. Roman, Dr. Monae, MRI brain images and report, and work on office note.      Diana Oliver PA-C      Disclaimer:  This note is prepared using voice recognition software and as such is likely to have errors despite attempts at proofreading.

## 2024-07-05 LAB — CRC RECOMMENDATION EXT: NORMAL

## 2024-07-08 ENCOUNTER — PATIENT OUTREACH (OUTPATIENT)
Facility: CLINIC | Age: 54
End: 2024-07-08
Payer: COMMERCIAL

## 2024-07-08 ENCOUNTER — OFFICE VISIT (OUTPATIENT)
Dept: NEUROSURGERY | Facility: CLINIC | Age: 54
End: 2024-07-08
Payer: COMMERCIAL

## 2024-07-08 VITALS
HEART RATE: 88 BPM | HEIGHT: 70 IN | RESPIRATION RATE: 18 BRPM | DIASTOLIC BLOOD PRESSURE: 80 MMHG | WEIGHT: 203 LBS | SYSTOLIC BLOOD PRESSURE: 124 MMHG | BODY MASS INDEX: 29.06 KG/M2

## 2024-07-08 DIAGNOSIS — G50.0 FACIAL PAIN SYNDROME: Primary | ICD-10-CM

## 2024-07-08 PROCEDURE — 1160F RVW MEDS BY RX/DR IN RCRD: CPT | Mod: CPTII,,, | Performed by: STUDENT IN AN ORGANIZED HEALTH CARE EDUCATION/TRAINING PROGRAM

## 2024-07-08 PROCEDURE — 3079F DIAST BP 80-89 MM HG: CPT | Mod: CPTII,,, | Performed by: STUDENT IN AN ORGANIZED HEALTH CARE EDUCATION/TRAINING PROGRAM

## 2024-07-08 PROCEDURE — 3074F SYST BP LT 130 MM HG: CPT | Mod: CPTII,,, | Performed by: STUDENT IN AN ORGANIZED HEALTH CARE EDUCATION/TRAINING PROGRAM

## 2024-07-08 PROCEDURE — 3044F HG A1C LEVEL LT 7.0%: CPT | Mod: CPTII,,, | Performed by: STUDENT IN AN ORGANIZED HEALTH CARE EDUCATION/TRAINING PROGRAM

## 2024-07-08 PROCEDURE — 99215 OFFICE O/P EST HI 40 MIN: CPT | Mod: ,,, | Performed by: STUDENT IN AN ORGANIZED HEALTH CARE EDUCATION/TRAINING PROGRAM

## 2024-07-08 PROCEDURE — 4010F ACE/ARB THERAPY RXD/TAKEN: CPT | Mod: CPTII,,, | Performed by: STUDENT IN AN ORGANIZED HEALTH CARE EDUCATION/TRAINING PROGRAM

## 2024-07-08 PROCEDURE — 3008F BODY MASS INDEX DOCD: CPT | Mod: CPTII,,, | Performed by: STUDENT IN AN ORGANIZED HEALTH CARE EDUCATION/TRAINING PROGRAM

## 2024-07-08 PROCEDURE — 1159F MED LIST DOCD IN RCRD: CPT | Mod: CPTII,,, | Performed by: STUDENT IN AN ORGANIZED HEALTH CARE EDUCATION/TRAINING PROGRAM

## 2024-07-08 RX ORDER — ACYCLOVIR 400 MG/1
TABLET ORAL
COMMUNITY
Start: 2024-07-03

## 2024-07-08 RX ORDER — METHYLPREDNISOLONE 4 MG/1
TABLET ORAL
Qty: 21 EACH | Refills: 0 | Status: CANCELLED | OUTPATIENT
Start: 2024-07-08 | End: 2024-07-29

## 2024-07-08 NOTE — PROGRESS NOTES
[facility]  Follow-up visit  Neurosurgery      Juanita Rosa   [id number]   1970     SUBJECTIVE:     Chief Complaint:  Right-sided ear, head and facial pain    History of Present Illness:   Patient is a 53 y.o. male seen today for follow-up. He was last seen in the office on 7/3/24 by BALDEMAR Thomson. He was referred for neurosurgical evaluation given worsening facial pain for the past two months or so. He states that he started having pain around two years ago. At the time, he had a root canal done and he experienced worsening pain afterwards. Around the same time he started experiencing right ear pain radiating to the ipsilateral side of his head and occasionally to the V1 and V3 distributions. He has a history of recurrent shingles and has had several episodes throughout the years. He was seen by ENT for workup of his ear pain which was unrevealing. He continued to follow-up with dentistry and eventually had a tooth extraction at the beginning of May. He says that after this procedure was done his pain exacerbated significantly to the point of needing narcotics PRN. He was evaluated by Neurology and he was started on Carbamazepine and Baclofen. These medications were not helpful. He was then transitioned to Lamictal 25mg BID and Lyrica 50mg TID which have provided slight relief. He is also completing a course of Acyclovir. Smiling, brushing his teeth or eating do not seem to trigger his pain. Lying on the right side tends to make it worse. No pain with chewing or wide mouth opening but has some pain when he touches the preauricular area.    Past Medical History:   Diagnosis Date    BPH (benign prostatic hyperplasia)     Depression     Erectile dysfunction     History of colon polyps     History of pneumothorax     Hyperlipidemia     Hypertension     Trigeminal neuralgia     Type II diabetes mellitus         Past Surgical History:   Procedure Laterality Date    ANTERIOR CERVICAL DISCECTOMY W/  "FUSION      CARPAL TUNNEL RELEASE Left     CHEST TUBE INSERTION      COLONOSCOPY      EXCISIONAL HEMORRHOIDECTOMY      LEFT HEART CATHETERIZATION      SHOULDER SURGERY Left     THORACIC DISCECTOMY      VASECTOMY          Social History     Tobacco Use    Smoking status: Former     Current packs/day: 0.00     Average packs/day: 1 pack/day for 15.6 years (15.6 ttl pk-yrs)     Types: Cigarettes     Start date: 1983     Quit date: 10/8/1999     Years since quittin.7    Smokeless tobacco: Never   Substance Use Topics    Alcohol use: Yes     Comment: social        Family History   Problem Relation Name Age of Onset    Heart disease Mother Imelda López     Diabetes Mother Imelda López     Heart disease Father Isidoro Gonzalez Carofatou Dominguez     Diabetes Father Isidoro Gonzalez Shelley Dominguez         Review of patient's allergies indicates:   Allergen Reactions    Sulfa (sulfonamide antibiotics) Rash        Current Outpatient Medications   Medication Instructions    acyclovir (ZOVIRAX) 400 MG tablet SMARTSI Tablet(s) By Mouth 5 Times Daily    acyclovir (ZOVIRAX) 800 mg, Oral, 5 times daily    baclofen (LIORESAL) 10 mg, Oral, As needed (PRN)    baclofen (LIORESAL) 20 mg, Oral, As needed (PRN)    FLUoxetine 20 mg, Oral     mg, Oral, As needed (PRN)    insulin degludec (TRESIBA FLEXTOUCH U-200) 50 Units, Subcutaneous, Daily    JARDIANCE 25 mg, Oral    lamoTRIgine (LAMICTAL) 25 mg, Oral, 2 times daily    lisinopriL 10 mg, Oral    metFORMIN (GLUCOPHAGE) 1,000 mg, Oral, 2 times daily    pen needle, diabetic (SURE COMFORT PEN NEEDLE) 31 gauge x 3/16" Ndle USE ONCE DAILY WITH INSULIN    phentermine (ADIPEX-P) 37.5 mg, Oral, Daily    pregabalin (LYRICA) 50 mg, Oral, 3 times daily    PROLENSA 0.07 % Drop 1 drop, Ophthalmic, Daily    rosuvastatin (CRESTOR) 10 mg, Oral    tadalafiL (CIALIS) 20 mg, Oral, Daily    tamsulosin (FLOMAX) 0.4 mg, Oral    tirzepatide 10 mg, Subcutaneous, Every 7 days      Review of Systems  12 point " "review of systems conducted, negative except as stated in the history of present illness. See HPI for details.    OBJECTIVE:     Visit Vitals  /80   Pulse 88   Resp 18   Ht 5' 10" (1.778 m)   Wt 92.1 kg (203 lb)   BMI 29.13 kg/m²      General:  Pleasant, Well-nourished, Well-groomed, mild distress due to pain    Head:  Normocephalic, atraumatic.    Lungs:  Non-labored respirations.    Abdomen:  Soft, nontender, nondistended.    Neurological:    Oriented to Person, Place, Time   Speech: normal  Memory, cognition, and affect are appropriate.  Pupils are equal, round, and reactive to light,  Extraocular movements are intact.  Facial sensation is intact bilaterally  Movements of facial expression are intact and symmetric.  Motor examination reveals 5/5 strength with normal tone and bulk.  Sensation is intact.  Gait is normal.    Imaging:  All pertinent neuroimaging independently reviewed. These findings were discussed in detail with the patient.     MRI brain w and wo contrast including thin cuts through brainstem was completed on 6/11/24  There are no lesions in the CP angle  There is no evidence of neurovascular conflict    DIAGNOSES:       ICD-10-CM ICD-9-CM   1. Facial pain syndrome  G50.0 351.8     ASSESSMENT/PLAN:     Mr. Rosa is a 53 year-old male with history of HTN, HLD, DM2, Depression and worsening right sided ear and facial pain for the past several months. We had a long discussion regarding his symptoms and imaging findings. I believe we are dealing with trigeminal neuropathic pain vs post herpetic neuralgia as opposed to typical Trigeminal Neuralgia. I encouraged him to continue with current medical management. I have also referred him to pain management for consideration of nerve blocks. He also asked for a referral to a TMJ specialist. I will see him back in 4-6 weeks to see how he is doing.    -     Ambulatory referral/consult to Dentistry; Future; Expected date: 07/15/2024  -     Ambulatory " referral/consult to Pain Clinic; Future; Expected date: 07/15/2024    Follow up in about 4 weeks (around 8/5/2024).    Alex Salgado MD  Neurosurgery  [facility]     45 minutes were personally spent on this visit including my review of available records from referring physician, prior imaging, comprehensive physical and neurologic examination and discussion with the patient.     Disclaimer:  This note is prepared using voice recognition software and as such is likely to have errors despite attempts at proofreading.

## 2024-07-08 NOTE — PROGRESS NOTES
Health Maintenance Topic(s) Outreach Outcomes & Actions Taken:    Colorectal Cancer Screening - Outreach Outcomes & Actions Taken  : External Records Uploaded, Care Team Updated, & History Updated if Applicable         Additional Notes:  Upload Colonoscopy Report:

## 2024-07-11 ENCOUNTER — TELEPHONE (OUTPATIENT)
Dept: FAMILY MEDICINE | Facility: CLINIC | Age: 54
End: 2024-07-11
Payer: COMMERCIAL

## 2024-07-11 DIAGNOSIS — Z79.4 TYPE 2 DIABETES MELLITUS WITHOUT COMPLICATION, WITH LONG-TERM CURRENT USE OF INSULIN: ICD-10-CM

## 2024-07-11 DIAGNOSIS — E11.9 TYPE 2 DIABETES MELLITUS WITHOUT COMPLICATION, WITH LONG-TERM CURRENT USE OF INSULIN: ICD-10-CM

## 2024-08-01 ENCOUNTER — TELEPHONE (OUTPATIENT)
Dept: NEUROSURGERY | Facility: CLINIC | Age: 54
End: 2024-08-01
Payer: COMMERCIAL

## 2024-08-01 NOTE — TELEPHONE ENCOUNTER
"I received an email from Diana at  office per Diana "We are in receipt of the referral for Juanita.  When speaking with him he states he has decided to proceed with seeing a TMJ specialist therefore, I informed him that if he needs pain management in the future to ask Dr. Johnsno to refer him to us.        "

## 2024-08-12 ENCOUNTER — OFFICE VISIT (OUTPATIENT)
Dept: NEUROSURGERY | Facility: CLINIC | Age: 54
End: 2024-08-12
Payer: COMMERCIAL

## 2024-08-12 VITALS
WEIGHT: 201.19 LBS | BODY MASS INDEX: 28.8 KG/M2 | HEIGHT: 70 IN | HEART RATE: 91 BPM | DIASTOLIC BLOOD PRESSURE: 74 MMHG | RESPIRATION RATE: 16 BRPM | SYSTOLIC BLOOD PRESSURE: 109 MMHG

## 2024-08-12 DIAGNOSIS — G50.0 FACIAL PAIN SYNDROME: Primary | ICD-10-CM

## 2024-08-12 PROCEDURE — 4010F ACE/ARB THERAPY RXD/TAKEN: CPT | Mod: CPTII,,, | Performed by: STUDENT IN AN ORGANIZED HEALTH CARE EDUCATION/TRAINING PROGRAM

## 2024-08-12 PROCEDURE — 99213 OFFICE O/P EST LOW 20 MIN: CPT | Mod: ,,, | Performed by: STUDENT IN AN ORGANIZED HEALTH CARE EDUCATION/TRAINING PROGRAM

## 2024-08-12 PROCEDURE — 3044F HG A1C LEVEL LT 7.0%: CPT | Mod: CPTII,,, | Performed by: STUDENT IN AN ORGANIZED HEALTH CARE EDUCATION/TRAINING PROGRAM

## 2024-08-12 PROCEDURE — 1159F MED LIST DOCD IN RCRD: CPT | Mod: CPTII,,, | Performed by: STUDENT IN AN ORGANIZED HEALTH CARE EDUCATION/TRAINING PROGRAM

## 2024-08-12 PROCEDURE — 3008F BODY MASS INDEX DOCD: CPT | Mod: CPTII,,, | Performed by: STUDENT IN AN ORGANIZED HEALTH CARE EDUCATION/TRAINING PROGRAM

## 2024-08-12 PROCEDURE — 1160F RVW MEDS BY RX/DR IN RCRD: CPT | Mod: CPTII,,, | Performed by: STUDENT IN AN ORGANIZED HEALTH CARE EDUCATION/TRAINING PROGRAM

## 2024-08-12 PROCEDURE — 3074F SYST BP LT 130 MM HG: CPT | Mod: CPTII,,, | Performed by: STUDENT IN AN ORGANIZED HEALTH CARE EDUCATION/TRAINING PROGRAM

## 2024-08-12 PROCEDURE — 3078F DIAST BP <80 MM HG: CPT | Mod: CPTII,,, | Performed by: STUDENT IN AN ORGANIZED HEALTH CARE EDUCATION/TRAINING PROGRAM

## 2024-08-15 ENCOUNTER — TELEPHONE (OUTPATIENT)
Dept: FAMILY MEDICINE | Facility: CLINIC | Age: 54
End: 2024-08-15
Payer: COMMERCIAL

## 2024-08-15 DIAGNOSIS — E11.9 TYPE 2 DIABETES MELLITUS WITHOUT COMPLICATION, WITH LONG-TERM CURRENT USE OF INSULIN: Primary | ICD-10-CM

## 2024-08-15 DIAGNOSIS — Z79.4 TYPE 2 DIABETES MELLITUS WITHOUT COMPLICATION, WITH LONG-TERM CURRENT USE OF INSULIN: Primary | ICD-10-CM

## 2024-08-27 ENCOUNTER — PATIENT MESSAGE (OUTPATIENT)
Dept: FAMILY MEDICINE | Facility: CLINIC | Age: 54
End: 2024-08-27
Payer: COMMERCIAL

## 2024-09-12 NOTE — PROGRESS NOTES
[facility]  Follow up visit  Neurosurgery      Juanita Rosa   [id number]   1970       SUBJECTIVE:     Chief Complaint:   Scheduled follow-up    History of Present Illness:   Patient is a 53 y.o.  male seen today for follow-up.  He was last seen in the office on 2024.  At the time, he complained of significant right-sided ear and facial pain.  Symptoms were not consistent with typical trigeminal neuralgia.  He was referred to an orofacial pain specialist (Dr. Tadeo).  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 Symptoms have improved after a course of acyclovir, neurosensory stent with capsaicin and lower thorax stabilization appliance.  He continues to take Lyrica and Lamictal.  He is now able to carry on normal activities including chewing, smiling and brushing his teeth.    Past Medical History:   Diagnosis Date    BPH (benign prostatic hyperplasia)     Depression     Erectile dysfunction     History of colon polyps     History of pneumothorax     Hyperlipidemia     Hypertension     Personal history of colonic polyps 2024    Trigeminal neuralgia     Type II diabetes mellitus         Past Surgical History:   Procedure Laterality Date    ANTERIOR CERVICAL DISCECTOMY W/ FUSION      CARPAL TUNNEL RELEASE Left     CHEST TUBE INSERTION      Colonoscoopy  2024    COLONOSCOPY      EXCISIONAL HEMORRHOIDECTOMY      LEFT HEART CATHETERIZATION      SHOULDER SURGERY Left     THORACIC DISCECTOMY      VASECTOMY          Social History     Tobacco Use    Smoking status: Former     Current packs/day: 0.00     Average packs/day: 1 pack/day for 15.6 years (15.6 ttl pk-yrs)     Types: Cigarettes     Start date: 1983     Quit date: 10/8/1999     Years since quittin.9    Smokeless tobacco: Never   Substance Use Topics    Alcohol use: Yes     Comment: social     "    Family History   Problem Relation Name Age of Onset    Heart disease Mother Imelda López     Diabetes Mother Imelda López     Heart disease Father Isidoro Gonzalez Carofatou Dominguez     Diabetes Father Isidoro Gonzalez Carofatou Dominguez         Review of patient's allergies indicates:   Allergen Reactions    Sulfa (sulfonamide antibiotics) Rash        Current Outpatient Medications   Medication Instructions    acyclovir (ZOVIRAX) 400 MG tablet SMARTSI Tablet(s) By Mouth 5 Times Daily    acyclovir (ZOVIRAX) 800 mg, Oral, 5 times daily    baclofen (LIORESAL) 10 mg, Oral, As needed (PRN)    baclofen (LIORESAL) 20 mg, Oral, As needed (PRN)    FLUoxetine 20 mg, Oral     mg, Oral, As needed (PRN)    insulin degludec (TRESIBA FLEXTOUCH U-200) 50 Units, Subcutaneous, Daily    JARDIANCE 25 mg, Oral    lamoTRIgine (LAMICTAL) 25 mg, Oral, 2 times daily    lisinopriL 10 mg, Oral    metFORMIN (GLUCOPHAGE) 1,000 mg, Oral, 2 times daily    pen needle, diabetic (SURE COMFORT PEN NEEDLE) 31 gauge x 3/16" Ndle USE ONCE DAILY WITH INSULIN    pregabalin (LYRICA) 50 mg, Oral, 3 times daily    PROLENSA 0.07 % Drop 1 drop, Ophthalmic, Daily    rosuvastatin (CRESTOR) 10 mg, Oral    tadalafiL (CIALIS) 20 mg, Oral, Daily    tamsulosin (FLOMAX) 0.4 mg, Oral    tirzepatide 7.5 mg, Subcutaneous, Every 7 days      Review of Systems  12 point review of systems conducted, negative except as stated in the history of present illness. See HPI for details.    OBJECTIVE:       Visit Vitals  /74   Pulse 91   Resp 16   Ht 5' 10" (1.778 m)   Wt 91.3 kg (201 lb 3.2 oz)   BMI 28.87 kg/m²        General:  Pleasant, Well-nourished, Well-groomed.    Head:  Normocephalic without any obvious abnormality. Atraumatic     Lungs:  Breathing is quiet with non-labored respirations.    Neurological:   Oriented to Person, Place, Time   Speech: normal  Memory, cognition, and affect are appropriate.  Pupils are equal, round, and reactive to light.  Extraocular " movements are intact.  Movements of facial expression are intact and symmetric.  Motor examination reveals 5/5 strength with normal tone and bulk.  Sensation is intact.  Gait is normal      Imaging:  All pertinent neuroimaging independently reviewed. Discussed these findings in detail with the patient.  No new imaging has been obtained    ASSESSMENT:       ICD-10-CM ICD-9-CM   1. Facial pain syndrome  G50.0 351.8     PLAN:     Juanita Rosa is a 53 year-old male who presented with significant right-sided facial pain.  Symptoms are likely a combination of posttraumatic trigeminal neuralgia and post herpetic trigeminal neuropathy.  Symptoms have improved after a course of acyclovir, neurosensory stent with capsaicin and lower thorax stabilization appliance. Given significant improvement in facial pain he is interested in tapering Lamictal.  I instructed to decreased to b.i.d. for a week then daily for a week then stop.  He will continue with Lyrica.  I will see him back in about 6 weeks to see how he is doing.    Follow up in about 6 weeks (around 9/23/2024).    Alex Salgado MD  Neurosurgery  [facility]    25 minutes were personally spent on this visit including my review of available records, prior imaging, comprehensive physical and neurologic examination and discussion with the patient.     Disclaimer:  This note is prepared using voice recognition software and as such is likely to have errors despite attempts at proofreading.

## 2024-09-15 DIAGNOSIS — F32.A DEPRESSION, UNSPECIFIED DEPRESSION TYPE: ICD-10-CM

## 2024-09-15 DIAGNOSIS — E11.9 TYPE 2 DIABETES MELLITUS WITHOUT COMPLICATION, WITHOUT LONG-TERM CURRENT USE OF INSULIN: ICD-10-CM

## 2024-09-16 RX ORDER — FLUOXETINE HYDROCHLORIDE 20 MG/1
20 CAPSULE ORAL
Qty: 90 CAPSULE | Refills: 1 | Status: SHIPPED | OUTPATIENT
Start: 2024-09-16

## 2024-09-16 RX ORDER — METFORMIN HYDROCHLORIDE 1000 MG/1
1000 TABLET ORAL 2 TIMES DAILY
Qty: 180 TABLET | Refills: 3 | Status: SHIPPED | OUTPATIENT
Start: 2024-09-16

## 2024-09-23 ENCOUNTER — OFFICE VISIT (OUTPATIENT)
Dept: FAMILY MEDICINE | Facility: CLINIC | Age: 54
End: 2024-09-23
Payer: COMMERCIAL

## 2024-09-23 VITALS
TEMPERATURE: 99 F | OXYGEN SATURATION: 98 % | HEART RATE: 69 BPM | WEIGHT: 194 LBS | BODY MASS INDEX: 27.77 KG/M2 | SYSTOLIC BLOOD PRESSURE: 116 MMHG | DIASTOLIC BLOOD PRESSURE: 71 MMHG | HEIGHT: 70 IN

## 2024-09-23 DIAGNOSIS — L81.9 PIGMENTED SKIN LESION: ICD-10-CM

## 2024-09-23 DIAGNOSIS — E11.9 TYPE 2 DIABETES MELLITUS WITHOUT COMPLICATION, WITH LONG-TERM CURRENT USE OF INSULIN: ICD-10-CM

## 2024-09-23 DIAGNOSIS — E78.5 HYPERLIPIDEMIA, UNSPECIFIED HYPERLIPIDEMIA TYPE: ICD-10-CM

## 2024-09-23 DIAGNOSIS — Z00.00 WELLNESS EXAMINATION: ICD-10-CM

## 2024-09-23 DIAGNOSIS — Z79.4 TYPE 2 DIABETES MELLITUS WITHOUT COMPLICATION, WITH LONG-TERM CURRENT USE OF INSULIN: ICD-10-CM

## 2024-09-23 DIAGNOSIS — I10 PRIMARY HYPERTENSION: ICD-10-CM

## 2024-09-23 DIAGNOSIS — G50.0 TRIGEMINAL NEURALGIA: Primary | ICD-10-CM

## 2024-09-23 PROCEDURE — 3044F HG A1C LEVEL LT 7.0%: CPT | Mod: CPTII,,, | Performed by: FAMILY MEDICINE

## 2024-09-23 PROCEDURE — 3008F BODY MASS INDEX DOCD: CPT | Mod: CPTII,,, | Performed by: FAMILY MEDICINE

## 2024-09-23 PROCEDURE — 99396 PREV VISIT EST AGE 40-64: CPT | Mod: 25,,, | Performed by: FAMILY MEDICINE

## 2024-09-23 PROCEDURE — 1159F MED LIST DOCD IN RCRD: CPT | Mod: CPTII,,, | Performed by: FAMILY MEDICINE

## 2024-09-23 PROCEDURE — 4010F ACE/ARB THERAPY RXD/TAKEN: CPT | Mod: CPTII,,, | Performed by: FAMILY MEDICINE

## 2024-09-23 PROCEDURE — 3078F DIAST BP <80 MM HG: CPT | Mod: CPTII,,, | Performed by: FAMILY MEDICINE

## 2024-09-23 PROCEDURE — 99213 OFFICE O/P EST LOW 20 MIN: CPT | Mod: 25,,, | Performed by: FAMILY MEDICINE

## 2024-09-23 PROCEDURE — 3074F SYST BP LT 130 MM HG: CPT | Mod: CPTII,,, | Performed by: FAMILY MEDICINE

## 2024-09-23 PROCEDURE — 11301 SHAVE SKIN LESION 0.6-1.0 CM: CPT | Mod: ,,, | Performed by: FAMILY MEDICINE

## 2024-09-23 RX ORDER — TIRZEPATIDE 10 MG/.5ML
INJECTION, SOLUTION SUBCUTANEOUS
COMMUNITY
Start: 2024-09-18

## 2024-09-23 RX ORDER — MELOXICAM 15 MG/1
15 TABLET ORAL EVERY MORNING
COMMUNITY
Start: 2024-09-09

## 2024-09-23 NOTE — PROGRESS NOTES
"SUBJECTIVE:  Juanita Rosa is a 53 y.o. male here for Follow-up (3 MONTH LAB)      HPI  Patient here for follow-up on chronic conditions.  See assessment and plan for individual list of issues.  He also has a mole on his left shoulder that has been growing he would like removed  Bruces allergies, medications, history, and problem list were updated as appropriate.    Review of Systems   See HPI  No results found for this or any previous visit (from the past 504 hour(s)).    OBJECTIVE:  Vital signs  Vitals:    09/23/24 1504   BP: 116/71   BP Location: Right arm   Patient Position: Sitting   Pulse: 69   Temp: 98.5 °F (36.9 °C)   TempSrc: Temporal   SpO2: 98%   Weight: 88 kg (194 lb)   Height: 5' 10" (1.778 m)        Physical Exam he has a 1 cm dark hyperpigmented plaque on the left shoulder with a regular border    ASSESSMENT/PLAN:  1. Trigeminal neuralgia  Has been doing a little better, seemed better after taking a round of acyclovir so it is possible this is viral in nature    2. Primary hypertension  Blood pressure well controlled on lisinopril    3. Type 2 diabetes mellitus without complication, with long-term current use of insulin  He is down to 20 units of Lantus daily.  Blood sugars has been doing really well so I think we can stop the insulin at this point and just continue the tirzepatide in the Jardiance along with metformin  Overview:  Lab Results   Component Value Date    HGBA1C 5.4 08/26/2024    HGBA1C 5.5 05/30/2024    HGBA1C 7.1 (H) 11/15/2023          4. Hyperlipidemia, unspecified hyperlipidemia type  Rosuvastatin 10 mg daily.  LDL is 60           5. Wellness examination  PSA is 0.8  Up-to-date on colon cancer screening    6. Pigmented skin lesion  Shave biopsy of lesion today sent for pathology  -     Specimen to Pathology Dermatology and skin neoplasms         Follow Up:  Follow up in about 3 months (around 12/23/2024) for recheck, fasting labs.            "

## 2024-09-26 ENCOUNTER — TELEPHONE (OUTPATIENT)
Dept: FAMILY MEDICINE | Facility: CLINIC | Age: 54
End: 2024-09-26
Payer: COMMERCIAL

## 2024-10-01 RX ORDER — PHENTERMINE HYDROCHLORIDE 37.5 MG/1
37.5 TABLET ORAL DAILY
Qty: 30 TABLET | Refills: 2 | Status: SHIPPED | OUTPATIENT
Start: 2024-10-01 | End: 2024-12-30

## 2024-11-27 DIAGNOSIS — E66.9 OBESITY, UNSPECIFIED CLASS, UNSPECIFIED OBESITY TYPE, UNSPECIFIED WHETHER SERIOUS COMORBIDITY PRESENT: Primary | ICD-10-CM

## 2024-11-27 RX ORDER — PHENTERMINE HYDROCHLORIDE 37.5 MG/1
TABLET ORAL
Qty: 30 TABLET | Refills: 2 | Status: SHIPPED | OUTPATIENT
Start: 2024-11-27

## 2024-12-20 DIAGNOSIS — N40.0 BENIGN PROSTATIC HYPERPLASIA, UNSPECIFIED WHETHER LOWER URINARY TRACT SYMPTOMS PRESENT: ICD-10-CM

## 2024-12-20 DIAGNOSIS — N52.9 ERECTILE DYSFUNCTION, UNSPECIFIED ERECTILE DYSFUNCTION TYPE: ICD-10-CM

## 2024-12-20 DIAGNOSIS — I10 HYPERTENSION, UNSPECIFIED TYPE: ICD-10-CM

## 2024-12-20 DIAGNOSIS — Z79.4 TYPE 2 DIABETES MELLITUS WITHOUT COMPLICATION, WITH LONG-TERM CURRENT USE OF INSULIN: ICD-10-CM

## 2024-12-20 DIAGNOSIS — E11.9 TYPE 2 DIABETES MELLITUS WITHOUT COMPLICATION, WITH LONG-TERM CURRENT USE OF INSULIN: ICD-10-CM

## 2024-12-20 DIAGNOSIS — N40.0 BENIGN PROSTATIC HYPERPLASIA WITHOUT URINARY OBSTRUCTION: ICD-10-CM

## 2024-12-23 RX ORDER — TADALAFIL 5 MG/1
20 TABLET ORAL
Qty: 90 TABLET | Refills: 15 | Status: SHIPPED | OUTPATIENT
Start: 2024-12-23

## 2024-12-23 RX ORDER — TAMSULOSIN HYDROCHLORIDE 0.4 MG/1
1 CAPSULE ORAL
Qty: 90 CAPSULE | Refills: 1 | Status: SHIPPED | OUTPATIENT
Start: 2024-12-23

## 2024-12-23 RX ORDER — LISINOPRIL 10 MG/1
10 TABLET ORAL
Qty: 90 TABLET | Refills: 1 | Status: SHIPPED | OUTPATIENT
Start: 2024-12-23

## 2024-12-23 RX ORDER — EMPAGLIFLOZIN 25 MG/1
25 TABLET, FILM COATED ORAL
Qty: 90 TABLET | Refills: 1 | Status: SHIPPED | OUTPATIENT
Start: 2024-12-23

## 2025-01-12 DIAGNOSIS — Z79.4 TYPE 2 DIABETES MELLITUS WITHOUT COMPLICATION, WITH LONG-TERM CURRENT USE OF INSULIN: Primary | ICD-10-CM

## 2025-01-12 DIAGNOSIS — E11.9 TYPE 2 DIABETES MELLITUS WITHOUT COMPLICATION, WITH LONG-TERM CURRENT USE OF INSULIN: Primary | ICD-10-CM

## 2025-01-13 RX ORDER — TIRZEPATIDE 10 MG/.5ML
INJECTION, SOLUTION SUBCUTANEOUS
Qty: 2 ML | Refills: 0 | Status: SHIPPED | OUTPATIENT
Start: 2025-01-13 | End: 2025-01-17

## 2025-01-17 ENCOUNTER — PATIENT MESSAGE (OUTPATIENT)
Dept: FAMILY MEDICINE | Facility: CLINIC | Age: 55
End: 2025-01-17

## 2025-01-17 ENCOUNTER — OFFICE VISIT (OUTPATIENT)
Dept: FAMILY MEDICINE | Facility: CLINIC | Age: 55
End: 2025-01-17
Payer: COMMERCIAL

## 2025-01-17 VITALS
OXYGEN SATURATION: 99 % | HEART RATE: 93 BPM | TEMPERATURE: 98 F | WEIGHT: 179.63 LBS | HEIGHT: 70 IN | DIASTOLIC BLOOD PRESSURE: 72 MMHG | SYSTOLIC BLOOD PRESSURE: 130 MMHG | BODY MASS INDEX: 25.72 KG/M2

## 2025-01-17 DIAGNOSIS — Z79.4 TYPE 2 DIABETES MELLITUS WITHOUT COMPLICATION, WITH LONG-TERM CURRENT USE OF INSULIN: Primary | ICD-10-CM

## 2025-01-17 DIAGNOSIS — E78.5 HYPERLIPIDEMIA, UNSPECIFIED HYPERLIPIDEMIA TYPE: ICD-10-CM

## 2025-01-17 DIAGNOSIS — I10 PRIMARY HYPERTENSION: ICD-10-CM

## 2025-01-17 DIAGNOSIS — E83.52 HYPERCALCEMIA: ICD-10-CM

## 2025-01-17 DIAGNOSIS — E11.9 TYPE 2 DIABETES MELLITUS WITHOUT COMPLICATION, WITH LONG-TERM CURRENT USE OF INSULIN: Primary | ICD-10-CM

## 2025-01-17 LAB
25(OH)D3+25(OH)D2 SERPL-MCNC: 38 NG/ML (ref 30–80)
CALCIUM SERPL-MCNC: 10.8 MG/DL (ref 8.4–10.2)
PTH-INTACT SERPL-MCNC: 32.5 PG/ML (ref 14–73)

## 2025-01-17 PROCEDURE — 99214 OFFICE O/P EST MOD 30 MIN: CPT | Mod: ,,, | Performed by: FAMILY MEDICINE

## 2025-01-17 PROCEDURE — 3075F SYST BP GE 130 - 139MM HG: CPT | Mod: CPTII,,, | Performed by: FAMILY MEDICINE

## 2025-01-17 PROCEDURE — 3078F DIAST BP <80 MM HG: CPT | Mod: CPTII,,, | Performed by: FAMILY MEDICINE

## 2025-01-17 PROCEDURE — 3008F BODY MASS INDEX DOCD: CPT | Mod: CPTII,,, | Performed by: FAMILY MEDICINE

## 2025-01-17 PROCEDURE — 3044F HG A1C LEVEL LT 7.0%: CPT | Mod: CPTII,,, | Performed by: FAMILY MEDICINE

## 2025-01-17 PROCEDURE — 82306 VITAMIN D 25 HYDROXY: CPT | Performed by: FAMILY MEDICINE

## 2025-01-17 PROCEDURE — 1159F MED LIST DOCD IN RCRD: CPT | Mod: CPTII,,, | Performed by: FAMILY MEDICINE

## 2025-01-17 PROCEDURE — 83970 ASSAY OF PARATHORMONE: CPT | Performed by: FAMILY MEDICINE

## 2025-01-17 NOTE — PROGRESS NOTES
SUBJECTIVE:  Juanita Rosa is a 54 y.o. male here for 3 mth      HPI  Patient is here for follow-up on chronic conditions.  See assessment and plan for individual list of issues.  Bruces allergies, medications, history, and problem list were updated as appropriate.    Review of Systems   No new problems to report    Recent Results (from the past 3 weeks)   Hemoglobin A1C    Collection Time: 01/10/25  8:32 AM   Result Value Ref Range    Hemoglobin A1c 5.8 (H) 4.8 - 5.6 %   C-Peptide    Collection Time: 01/10/25  8:32 AM   Result Value Ref Range    C-Peptide 3.4 1.1 - 4.4 ng/mL   Comprehensive Metabolic Panel    Collection Time: 01/10/25  8:32 AM   Result Value Ref Range    Glucose 99 70 - 99 mg/dL    BUN 14 6 - 24 mg/dL    Creatinine 0.95 0.76 - 1.27 mg/dL    eGFR 95 >59 mL/min/1.73    BUN/Creatinine Ratio 15 9 - 20    Sodium 137 134 - 144 mmol/L    Potassium 4.9 3.5 - 5.2 mmol/L    Chloride 98 96 - 106 mmol/L    CO2 26 20 - 29 mmol/L    Calcium 11.5 (H) 8.7 - 10.2 mg/dL    Protein, Total 7.1 6.0 - 8.5 g/dL    Albumin 4.7 3.8 - 4.9 g/dL    Globulin, Total 2.4 1.5 - 4.5 g/dL    Total Bilirubin 1.1 0.0 - 1.2 mg/dL    Alkaline Phosphatase 63 44 - 121 IU/L    AST 18 0 - 40 IU/L    ALT 19 0 - 44 IU/L   CBC Auto Differential    Collection Time: 01/10/25  8:32 AM   Result Value Ref Range    WBC 7.7 3.4 - 10.8 x10E3/uL    RBC 5.62 4.14 - 5.80 x10E6/uL    Hemoglobin 17.0 13.0 - 17.7 g/dL    Hematocrit 49.4 37.5 - 51.0 %    MCV 88 79 - 97 fL    MCH 30.2 26.6 - 33.0 pg    MCHC 34.4 31.5 - 35.7 g/dL    RDW 12.0 11.6 - 15.4 %    Platelets 268 150 - 450 x10E3/uL    Neutrophils 60 Not Estab. %    Lymphs 28 Not Estab. %    Monocytes 7 Not Estab. %    Eos 4 Not Estab. %    Basos 1 Not Estab. %    Neutrophils (Absolute) 4.5 1.4 - 7.0 x10E3/uL    Lymphs (Absolute) 2.2 0.7 - 3.1 x10E3/uL    Monocytes(Absolute) 0.6 0.1 - 0.9 x10E3/uL    Eos (Absolute) 0.3 0.0 - 0.4 x10E3/uL    Baso (Absolute) 0.1 0.0 - 0.2 x10E3/uL    Immature  "Granulocytes 0 Not Estab. %   PTH, Intact and Calcium    Collection Time: 01/17/25  9:13 AM   Result Value Ref Range    PARATHYROID HORMONE INTACT 32.5 14.0 - 73.0 pg/mL    Calcium 10.8 (H) 8.4 - 10.2 mg/dL       OBJECTIVE:  Vital signs  Vitals:    01/17/25 0839   BP: 130/72   BP Location: Right arm   Patient Position: Sitting   Pulse: 93   Temp: 97.7 °F (36.5 °C)   TempSrc: Oral   SpO2: 99%   Weight: 81.5 kg (179 lb 9.6 oz)   Height: 5' 9.69" (1.77 m)        Physical Exam heart with a regular rate and rhythm    ASSESSMENT/PLAN:  1. Type 2 diabetes mellitus without complication, with long-term current use of insulin  A1c is doing great.  I think we can reduce his tirzepatide to 7.5 mg weekly down from 10 mg  Overview:  Lab Results   Component Value Date    HGBA1C 5.4 08/26/2024    HGBA1C 5.5 05/30/2024    HGBA1C 7.1 (H) 11/15/2023          2. Hyperlipidemia, unspecified hyperlipidemia type  Continue statin  Overview:  Lab Results   Component Value Date    CHOL 116 08/26/2024    HDL 41 08/26/2024    LDLDIRECT 125.4 (H) 05/04/2023    TRIG 71 08/26/2024          3. Primary hypertension  Blood pressure is well controlled on lisinopril    4. Hypercalcemia  Calcium was elevated 11.3 which is something new.  We will check a PTH and vitamin-D and repeat calcium  -     PTH, Intact and Calcium; Future; Expected date: 01/17/2025  -     Vitamin D; Future; Expected date: 01/17/2025    Other orders  -     tirzepatide 7.5 mg/0.5 mL PnIj; Inject 7.5 mg into the skin every 7 days.  Dispense: 6 mL; Refill: 1         Follow Up:  Follow up in about 3 months (around 4/17/2025) for recheck, fasting labs.            "

## 2025-02-02 DIAGNOSIS — N40.0 BENIGN PROSTATIC HYPERPLASIA WITHOUT URINARY OBSTRUCTION: ICD-10-CM

## 2025-02-02 DIAGNOSIS — N52.9 ERECTILE DYSFUNCTION, UNSPECIFIED ERECTILE DYSFUNCTION TYPE: ICD-10-CM

## 2025-02-03 RX ORDER — TADALAFIL 5 MG/1
20 TABLET ORAL DAILY
Qty: 90 TABLET | Refills: 15 | Status: SHIPPED | OUTPATIENT
Start: 2025-02-03

## 2025-02-05 ENCOUNTER — PATIENT MESSAGE (OUTPATIENT)
Dept: FAMILY MEDICINE | Facility: CLINIC | Age: 55
End: 2025-02-05
Payer: COMMERCIAL

## 2025-03-12 DIAGNOSIS — E66.9 OBESITY, UNSPECIFIED CLASS, UNSPECIFIED OBESITY TYPE, UNSPECIFIED WHETHER SERIOUS COMORBIDITY PRESENT: ICD-10-CM

## 2025-03-12 RX ORDER — PHENTERMINE HYDROCHLORIDE 37.5 MG/1
TABLET ORAL
Qty: 30 TABLET | Refills: 2 | Status: SHIPPED | OUTPATIENT
Start: 2025-03-12

## 2025-03-27 DIAGNOSIS — E78.5 HYPERLIPIDEMIA, UNSPECIFIED HYPERLIPIDEMIA TYPE: ICD-10-CM

## 2025-03-27 DIAGNOSIS — F32.A DEPRESSION, UNSPECIFIED DEPRESSION TYPE: ICD-10-CM

## 2025-03-27 RX ORDER — ROSUVASTATIN CALCIUM 10 MG/1
10 TABLET, COATED ORAL
Qty: 90 TABLET | Refills: 3 | Status: SHIPPED | OUTPATIENT
Start: 2025-03-27

## 2025-03-27 RX ORDER — FLUOXETINE HYDROCHLORIDE 20 MG/1
20 CAPSULE ORAL
Qty: 90 CAPSULE | Refills: 1 | Status: SHIPPED | OUTPATIENT
Start: 2025-03-27

## 2025-04-21 PROCEDURE — 80048 BASIC METABOLIC PNL TOTAL CA: CPT | Performed by: FAMILY MEDICINE

## 2025-04-21 PROCEDURE — 80061 LIPID PANEL: CPT | Performed by: FAMILY MEDICINE

## 2025-04-21 PROCEDURE — 83036 HEMOGLOBIN GLYCOSYLATED A1C: CPT | Performed by: FAMILY MEDICINE

## 2025-05-05 ENCOUNTER — OFFICE VISIT (OUTPATIENT)
Dept: FAMILY MEDICINE | Facility: CLINIC | Age: 55
End: 2025-05-05
Payer: COMMERCIAL

## 2025-05-05 VITALS
DIASTOLIC BLOOD PRESSURE: 62 MMHG | TEMPERATURE: 98 F | HEIGHT: 70 IN | OXYGEN SATURATION: 99 % | HEART RATE: 79 BPM | WEIGHT: 174.81 LBS | SYSTOLIC BLOOD PRESSURE: 110 MMHG | BODY MASS INDEX: 25.03 KG/M2

## 2025-05-05 DIAGNOSIS — Z00.00 WELLNESS EXAMINATION: ICD-10-CM

## 2025-05-05 DIAGNOSIS — E78.5 HYPERLIPIDEMIA, UNSPECIFIED HYPERLIPIDEMIA TYPE: ICD-10-CM

## 2025-05-05 DIAGNOSIS — E11.9 TYPE 2 DIABETES MELLITUS WITHOUT COMPLICATION, WITH LONG-TERM CURRENT USE OF INSULIN: Primary | ICD-10-CM

## 2025-05-05 DIAGNOSIS — E83.52 HYPERCALCEMIA: ICD-10-CM

## 2025-05-05 DIAGNOSIS — N40.0 BENIGN PROSTATIC HYPERPLASIA, UNSPECIFIED WHETHER LOWER URINARY TRACT SYMPTOMS PRESENT: ICD-10-CM

## 2025-05-05 DIAGNOSIS — Z79.4 TYPE 2 DIABETES MELLITUS WITHOUT COMPLICATION, WITH LONG-TERM CURRENT USE OF INSULIN: Primary | ICD-10-CM

## 2025-05-05 DIAGNOSIS — E11.3212 TYPE 2 DIABETES MELLITUS WITH LEFT EYE AFFECTED BY MILD NONPROLIFERATIVE RETINOPATHY AND MACULAR EDEMA, WITHOUT LONG-TERM CURRENT USE OF INSULIN: ICD-10-CM

## 2025-05-05 DIAGNOSIS — I10 PRIMARY HYPERTENSION: ICD-10-CM

## 2025-05-05 PROCEDURE — 99214 OFFICE O/P EST MOD 30 MIN: CPT | Mod: ,,, | Performed by: FAMILY MEDICINE

## 2025-05-05 PROCEDURE — 3008F BODY MASS INDEX DOCD: CPT | Mod: CPTII,,, | Performed by: FAMILY MEDICINE

## 2025-05-05 PROCEDURE — 1159F MED LIST DOCD IN RCRD: CPT | Mod: CPTII,,, | Performed by: FAMILY MEDICINE

## 2025-05-05 PROCEDURE — 3078F DIAST BP <80 MM HG: CPT | Mod: CPTII,,, | Performed by: FAMILY MEDICINE

## 2025-05-05 PROCEDURE — 3044F HG A1C LEVEL LT 7.0%: CPT | Mod: CPTII,,, | Performed by: FAMILY MEDICINE

## 2025-05-05 PROCEDURE — 4010F ACE/ARB THERAPY RXD/TAKEN: CPT | Mod: CPTII,,, | Performed by: FAMILY MEDICINE

## 2025-05-05 PROCEDURE — 3074F SYST BP LT 130 MM HG: CPT | Mod: CPTII,,, | Performed by: FAMILY MEDICINE

## 2025-05-05 NOTE — PROGRESS NOTES
"SUBJECTIVE:  Juanita Rosa is a 54 y.o. male here for Follow-up (3 MONTH LAB)      HPI  Patient here for follow-up on chronic conditions.  See assessment and plan for individual list of issues.  He has been doing pretty well as of late.  He has been having some issues with his feet.  He had a small callus that has formed and has been toenail that had fallen off has grown back in his having little bit of discomfort again  Bruces allergies, medications, history, and problem list were updated as appropriate.    Review of Systems   See HPI.    Recent Results (from the past 3 weeks)   Hemoglobin A1C    Collection Time: 04/21/25  9:45 AM   Result Value Ref Range    Hemoglobin A1c 5.6 4.0 - 6.0 %    Estimated Average Glucose 114.0 70.0 - 115.0 mg/dL   Lipid Panel    Collection Time: 04/21/25  9:45 AM   Result Value Ref Range    Cholesterol Total 122 0 - 200 mg/dL    HDL Cholesterol 47 40 - 60 mg/dL    Triglyceride 58 30 - 200 mg/dL    LDL Cholesterol Direct 62.9 30.0 - 100.0 mg/dL   Basic Metabolic Panel    Collection Time: 04/21/25  9:45 AM   Result Value Ref Range    Sodium 138 136 - 145 mmol/L    Potassium 4.4 3.5 - 5.1 mmol/L    Chloride 107 98 - 110 mmol/L    CO2 24 21 - 32 mmol/L    Glucose 121 (H) 70 - 115 mg/dL    Blood Urea Nitrogen 13 7.0 - 20.0 mg/dL    Creatinine 0.83 0.66 - 1.25 mg/dL    BUN/Creatinine Ratio 16 12 - 20    Calcium 9.7 8.4 - 10.2 mg/dL    Anion Gap 7.0 2.0 - 13.0 mEq/L    eGFR >90 mL/min/1.73/m2       OBJECTIVE:  Vital signs  Vitals:    05/05/25 1032   BP: 110/62   BP Location: Right arm   Patient Position: Sitting   Pulse: 79   Temp: 97.7 °F (36.5 °C)   TempSrc: Oral   SpO2: 99%   Weight: 79.3 kg (174 lb 12.8 oz)   Height: 5' 9.69" (1.77 m)        Physical Exam multiple toenails with white dystrophy.  Small callus on the medial side of the right great toe    ASSESSMENT/PLAN:  1. Type 2 diabetes mellitus without complication, with long-term current use of insulin  A1c is doing great.  Continue " metformin Jardiance and present dose of hurts appetite 7.5 mg weekly.  We have reduce this dose over the last 6 months  Overview:  Lab Results   Component Value Date    HGBA1C 5.6 04/21/2025    HGBA1C 5.8 (H) 01/10/2025    HGBA1C 5.4 08/26/2024        Orders:  -     POCT Microalbumin/Creatinine Ratio  -     Microalbumin/Creatinine Ratio, Urine; Future; Expected date: 05/07/2026    2. Hyperlipidemia, unspecified hyperlipidemia type  On rosuvastatin and LDL to goal  Overview:  Lab Results   Component Value Date    CHOL 122 04/21/2025    HDL 47 04/21/2025    LDLDIRECT 62.9 04/21/2025    TRIG 58 04/21/2025        Orders:  -     Microalbumin/Creatinine Ratio, Urine; Future; Expected date: 05/07/2026    3. Primary hypertension  Blood pressure well controlled on lisinopril  -     Microalbumin/Creatinine Ratio, Urine; Future; Expected date: 05/07/2026    4. Hypercalcemia  Hypercalcemia has now resolved.  He thinks it was from some vitamins he has been taking  -     Microalbumin/Creatinine Ratio, Urine; Future; Expected date: 05/07/2026    5. BPH - continue tamsulosin       Follow Up:  Follow up in about 6 months (around 11/5/2025) for recheck, fasting labs, wellness.

## 2025-05-22 DIAGNOSIS — N40.0 BENIGN PROSTATIC HYPERPLASIA WITHOUT URINARY OBSTRUCTION: ICD-10-CM

## 2025-05-22 DIAGNOSIS — N52.9 ERECTILE DYSFUNCTION, UNSPECIFIED ERECTILE DYSFUNCTION TYPE: ICD-10-CM

## 2025-05-22 RX ORDER — TADALAFIL 5 MG/1
20 TABLET ORAL DAILY
Qty: 90 TABLET | Refills: 15 | Status: SHIPPED | OUTPATIENT
Start: 2025-05-22

## 2025-06-03 ENCOUNTER — OFFICE VISIT (OUTPATIENT)
Dept: FAMILY MEDICINE | Facility: CLINIC | Age: 55
End: 2025-06-03
Payer: COMMERCIAL

## 2025-06-03 VITALS
TEMPERATURE: 98 F | OXYGEN SATURATION: 98 % | BODY MASS INDEX: 25.17 KG/M2 | SYSTOLIC BLOOD PRESSURE: 119 MMHG | WEIGHT: 175.81 LBS | HEIGHT: 70 IN | HEART RATE: 93 BPM | DIASTOLIC BLOOD PRESSURE: 66 MMHG

## 2025-06-03 DIAGNOSIS — J02.9 SORE THROAT: ICD-10-CM

## 2025-06-03 DIAGNOSIS — J02.0 STREP PHARYNGITIS: Primary | ICD-10-CM

## 2025-06-03 LAB
CTP QC/QA: YES
S PYO RRNA THROAT QL PROBE: POSITIVE

## 2025-06-03 PROCEDURE — 3044F HG A1C LEVEL LT 7.0%: CPT | Mod: CPTII,,, | Performed by: NURSE PRACTITIONER

## 2025-06-03 PROCEDURE — 4010F ACE/ARB THERAPY RXD/TAKEN: CPT | Mod: CPTII,,, | Performed by: NURSE PRACTITIONER

## 2025-06-03 PROCEDURE — 1159F MED LIST DOCD IN RCRD: CPT | Mod: CPTII,,, | Performed by: NURSE PRACTITIONER

## 2025-06-03 PROCEDURE — 3078F DIAST BP <80 MM HG: CPT | Mod: CPTII,,, | Performed by: NURSE PRACTITIONER

## 2025-06-03 PROCEDURE — 87880 STREP A ASSAY W/OPTIC: CPT | Mod: QW,,, | Performed by: NURSE PRACTITIONER

## 2025-06-03 PROCEDURE — 3074F SYST BP LT 130 MM HG: CPT | Mod: CPTII,,, | Performed by: NURSE PRACTITIONER

## 2025-06-03 PROCEDURE — 99213 OFFICE O/P EST LOW 20 MIN: CPT | Mod: ,,, | Performed by: NURSE PRACTITIONER

## 2025-06-03 PROCEDURE — 3008F BODY MASS INDEX DOCD: CPT | Mod: CPTII,,, | Performed by: NURSE PRACTITIONER

## 2025-06-03 PROCEDURE — 1160F RVW MEDS BY RX/DR IN RCRD: CPT | Mod: CPTII,,, | Performed by: NURSE PRACTITIONER

## 2025-06-03 RX ORDER — AMOXICILLIN 500 MG/1
500 CAPSULE ORAL EVERY 8 HOURS
Qty: 30 CAPSULE | Refills: 0 | Status: SHIPPED | OUTPATIENT
Start: 2025-06-03 | End: 2025-06-13

## 2025-06-30 DIAGNOSIS — E11.9 TYPE 2 DIABETES MELLITUS WITHOUT COMPLICATION, WITH LONG-TERM CURRENT USE OF INSULIN: ICD-10-CM

## 2025-06-30 DIAGNOSIS — I10 HYPERTENSION, UNSPECIFIED TYPE: ICD-10-CM

## 2025-06-30 DIAGNOSIS — Z79.4 TYPE 2 DIABETES MELLITUS WITHOUT COMPLICATION, WITH LONG-TERM CURRENT USE OF INSULIN: ICD-10-CM

## 2025-06-30 DIAGNOSIS — N40.0 BENIGN PROSTATIC HYPERPLASIA, UNSPECIFIED WHETHER LOWER URINARY TRACT SYMPTOMS PRESENT: ICD-10-CM

## 2025-06-30 RX ORDER — TAMSULOSIN HYDROCHLORIDE 0.4 MG/1
1 CAPSULE ORAL
Qty: 90 CAPSULE | Refills: 1 | Status: SHIPPED | OUTPATIENT
Start: 2025-06-30

## 2025-06-30 RX ORDER — EMPAGLIFLOZIN 25 MG/1
25 TABLET, FILM COATED ORAL
Qty: 90 TABLET | Refills: 1 | Status: SHIPPED | OUTPATIENT
Start: 2025-06-30

## 2025-06-30 RX ORDER — LISINOPRIL 10 MG/1
10 TABLET ORAL
Qty: 90 TABLET | Refills: 1 | Status: SHIPPED | OUTPATIENT
Start: 2025-06-30

## 2025-07-29 DIAGNOSIS — E66.9 OBESITY, UNSPECIFIED CLASS, UNSPECIFIED OBESITY TYPE, UNSPECIFIED WHETHER SERIOUS COMORBIDITY PRESENT: ICD-10-CM

## 2025-08-25 DIAGNOSIS — N40.0 BENIGN PROSTATIC HYPERPLASIA WITHOUT URINARY OBSTRUCTION: ICD-10-CM

## 2025-08-25 DIAGNOSIS — N52.9 ERECTILE DYSFUNCTION, UNSPECIFIED ERECTILE DYSFUNCTION TYPE: ICD-10-CM

## 2025-08-25 RX ORDER — TADALAFIL 5 MG/1
20 TABLET ORAL DAILY
Qty: 90 TABLET | Refills: 15 | Status: SHIPPED | OUTPATIENT
Start: 2025-08-25

## 2025-08-26 RX ORDER — PHENTERMINE HYDROCHLORIDE 37.5 MG/1
TABLET ORAL
Qty: 30 TABLET | Refills: 2 | Status: SHIPPED | OUTPATIENT
Start: 2025-08-26

## 2025-08-27 ENCOUNTER — PATIENT MESSAGE (OUTPATIENT)
Dept: FAMILY MEDICINE | Facility: CLINIC | Age: 55
End: 2025-08-27
Payer: COMMERCIAL

## 2025-08-27 DIAGNOSIS — E11.9 TYPE 2 DIABETES MELLITUS WITHOUT COMPLICATION, WITH LONG-TERM CURRENT USE OF INSULIN: Primary | ICD-10-CM

## 2025-08-27 DIAGNOSIS — Z79.4 TYPE 2 DIABETES MELLITUS WITHOUT COMPLICATION, WITH LONG-TERM CURRENT USE OF INSULIN: Primary | ICD-10-CM
